# Patient Record
Sex: FEMALE | Race: WHITE | NOT HISPANIC OR LATINO | Employment: OTHER | URBAN - METROPOLITAN AREA
[De-identification: names, ages, dates, MRNs, and addresses within clinical notes are randomized per-mention and may not be internally consistent; named-entity substitution may affect disease eponyms.]

---

## 2017-11-21 ENCOUNTER — CONSULT (OUTPATIENT)
Dept: ONCOLOGY | Facility: CLINIC | Age: 62
End: 2017-11-21

## 2017-11-21 ENCOUNTER — LAB (OUTPATIENT)
Dept: LAB | Facility: HOSPITAL | Age: 62
End: 2017-11-21

## 2017-11-21 VITALS
DIASTOLIC BLOOD PRESSURE: 92 MMHG | RESPIRATION RATE: 16 BRPM | SYSTOLIC BLOOD PRESSURE: 130 MMHG | HEIGHT: 61 IN | TEMPERATURE: 97.8 F | BODY MASS INDEX: 31.72 KG/M2 | WEIGHT: 168 LBS | HEART RATE: 61 BPM | OXYGEN SATURATION: 95 %

## 2017-11-21 DIAGNOSIS — M81.0 OSTEOPOROSIS, UNSPECIFIED OSTEOPOROSIS TYPE, UNSPECIFIED PATHOLOGICAL FRACTURE PRESENCE: ICD-10-CM

## 2017-11-21 DIAGNOSIS — C73 FOLLICULAR THYROID CANCER (HCC): Primary | ICD-10-CM

## 2017-11-21 LAB
25(OH)D3 SERPL-MCNC: 28.4 NG/ML (ref 30–100)
ALBUMIN SERPL-MCNC: 4.4 G/DL (ref 3.5–5.2)
ALBUMIN/GLOB SERPL: 1.6 G/DL (ref 1.1–2.4)
ALP SERPL-CCNC: 63 U/L (ref 38–116)
ALT SERPL W P-5'-P-CCNC: 14 U/L (ref 0–33)
ANION GAP SERPL CALCULATED.3IONS-SCNC: 12.7 MMOL/L
AST SERPL-CCNC: 17 U/L (ref 0–32)
BASOPHILS # BLD AUTO: 0.06 10*3/MM3 (ref 0–0.1)
BASOPHILS NFR BLD AUTO: 0.6 % (ref 0–1.1)
BILIRUB SERPL-MCNC: 0.3 MG/DL (ref 0.1–1.2)
BUN BLD-MCNC: 15 MG/DL (ref 6–20)
BUN/CREAT SERPL: 26.3 (ref 7.3–30)
CALCIUM SPEC-SCNC: 9.2 MG/DL (ref 8.5–10.2)
CHLORIDE SERPL-SCNC: 102 MMOL/L (ref 98–107)
CO2 SERPL-SCNC: 24.3 MMOL/L (ref 22–29)
CREAT BLD-MCNC: 0.57 MG/DL (ref 0.6–1.1)
DEPRECATED RDW RBC AUTO: 45.7 FL (ref 37–49)
EOSINOPHIL # BLD AUTO: 0.29 10*3/MM3 (ref 0–0.36)
EOSINOPHIL NFR BLD AUTO: 2.9 % (ref 1–5)
ERYTHROCYTE [DISTWIDTH] IN BLOOD BY AUTOMATED COUNT: 13 % (ref 11.7–14.5)
GFR SERPL CREATININE-BSD FRML MDRD: 108 ML/MIN/1.73
GLOBULIN UR ELPH-MCNC: 2.7 GM/DL (ref 1.8–3.5)
GLUCOSE BLD-MCNC: 101 MG/DL (ref 74–124)
HCT VFR BLD AUTO: 42.8 % (ref 34–45)
HGB BLD-MCNC: 14.2 G/DL (ref 11.5–14.9)
IMM GRANULOCYTES # BLD: 0.05 10*3/MM3 (ref 0–0.03)
IMM GRANULOCYTES NFR BLD: 0.5 % (ref 0–0.5)
LYMPHOCYTES # BLD AUTO: 4.17 10*3/MM3 (ref 1–3.5)
LYMPHOCYTES NFR BLD AUTO: 42.3 % (ref 20–49)
MCH RBC QN AUTO: 31.8 PG (ref 27–33)
MCHC RBC AUTO-ENTMCNC: 33.2 G/DL (ref 32–35)
MCV RBC AUTO: 95.7 FL (ref 83–97)
MONOCYTES # BLD AUTO: 0.57 10*3/MM3 (ref 0.25–0.8)
MONOCYTES NFR BLD AUTO: 5.8 % (ref 4–12)
NEUTROPHILS # BLD AUTO: 4.71 10*3/MM3 (ref 1.5–7)
NEUTROPHILS NFR BLD AUTO: 47.9 % (ref 39–75)
NRBC BLD MANUAL-RTO: 0 /100 WBC (ref 0–0)
PLATELET # BLD AUTO: 243 10*3/MM3 (ref 150–375)
PMV BLD AUTO: 10.8 FL (ref 8.9–12.1)
POTASSIUM BLD-SCNC: 4.5 MMOL/L (ref 3.5–4.7)
PROT SERPL-MCNC: 7.1 G/DL (ref 6.3–8)
RBC # BLD AUTO: 4.47 10*6/MM3 (ref 3.9–5)
SODIUM BLD-SCNC: 139 MMOL/L (ref 134–145)
T-UPTAKE NFR SERPL: 1.12 TBI (ref 0.8–1.3)
T4 SERPL-MCNC: 6.21 MCG/DL (ref 4.5–11.7)
TSH SERPL DL<=0.05 MIU/L-ACNC: 2.05 MIU/ML (ref 0.27–4.2)
WBC NRBC COR # BLD: 9.85 10*3/MM3 (ref 4–10)

## 2017-11-21 PROCEDURE — 84436 ASSAY OF TOTAL THYROXINE: CPT | Performed by: INTERNAL MEDICINE

## 2017-11-21 PROCEDURE — 99204 OFFICE O/P NEW MOD 45 MIN: CPT | Performed by: INTERNAL MEDICINE

## 2017-11-21 PROCEDURE — 82306 VITAMIN D 25 HYDROXY: CPT | Performed by: INTERNAL MEDICINE

## 2017-11-21 PROCEDURE — 80053 COMPREHEN METABOLIC PANEL: CPT | Performed by: INTERNAL MEDICINE

## 2017-11-21 PROCEDURE — 84479 ASSAY OF THYROID (T3 OR T4): CPT | Performed by: INTERNAL MEDICINE

## 2017-11-21 PROCEDURE — 36415 COLL VENOUS BLD VENIPUNCTURE: CPT | Performed by: INTERNAL MEDICINE

## 2017-11-21 PROCEDURE — 36416 COLLJ CAPILLARY BLOOD SPEC: CPT | Performed by: INTERNAL MEDICINE

## 2017-11-21 PROCEDURE — 84443 ASSAY THYROID STIM HORMONE: CPT | Performed by: INTERNAL MEDICINE

## 2017-11-21 PROCEDURE — 85025 COMPLETE CBC W/AUTO DIFF WBC: CPT | Performed by: INTERNAL MEDICINE

## 2017-11-21 RX ORDER — ERGOCALCIFEROL 1.25 MG/1
CAPSULE ORAL
Refills: 3 | COMMUNITY
Start: 2017-10-30 | End: 2018-03-24 | Stop reason: SDUPTHER

## 2017-11-21 RX ORDER — QUETIAPINE FUMARATE 50 MG/1
50 TABLET, FILM COATED ORAL
COMMUNITY
End: 2018-03-23

## 2017-11-21 RX ORDER — OXYCODONE AND ACETAMINOPHEN 10; 325 MG/1; MG/1
TABLET ORAL
Refills: 0 | COMMUNITY
Start: 2017-11-01 | End: 2018-03-23

## 2017-11-21 RX ORDER — CHLORZOXAZONE 500 MG/1
TABLET ORAL
Refills: 0 | COMMUNITY
Start: 2017-10-05 | End: 2018-03-23

## 2017-11-21 RX ORDER — FOLIC ACID 1 MG/1
1 TABLET ORAL DAILY
COMMUNITY

## 2017-11-21 NOTE — PROGRESS NOTES
Subjective     REASON FOR CONSULTATION:  Follicular thyroid cancer  Provide an opinion on any further workup or treatment                             REQUESTING PHYSICIAN:  Dr. Ros Soto    RECORDS OBTAINED:  Records of the patients history including those obtained from the referring provider were reviewed and summarized in detail.    HISTORY OF PRESENT ILLNESS:  The patient is a 62 y.o. year old female who is here for an opinion about the above issue.    History of Present Illness patient is a 62-year-old female who was diagnosed with follicular thyroid cancer at [place] several years ago.  She was followed by an endocrinologist Dr. Mary Bunch, has been following up with her thyroglobulin levels.  Patient states that she underwent a right thyroidectomy and there they're concerned that there was a small spot in the left thyroid gland and she requires ultrasound of the thyroid gland done.  Unfortunately we do not have any of the records and we will try to complicate with her endocrinologist at [place].  The phone number there is [phone].  Currently she is gaining weight and she feels fatigued and tired.  She is concerned that her there is worsening of her hypothyroidism.    Patient denies any chest pain, shortness of breath, denies any nausea vomiting or headache.  She doesn't have any neck pain.  She needs to be referred to endocrinology here as she is new to the [facility].    Past Medical History:   Diagnosis Date   • Anxiety    • Basal cell carcinoma    • Depression    • Gout    • History of osteopenia    • History of thyroid nodule    • History of vitamin D deficiency    • Hypercholesterolemia    • Papillary thyroid carcinoma    • Postsurgical hypothyroidism         Past Surgical History:   Procedure Laterality Date   • APPENDECTOMY  1975   • BACK SURGERY      x2   • HYSTERECTOMY  2001   • THYROIDECTOMY, PARTIAL  2013             ALLERGIES:    Allergies   Allergen Reactions   • Latex    •  "Levothyroxine Sodium Hives     Synthroid   • Penicillins     She is allergic to let neck which causes her to swell    Social history she used to be a caterer.  She smoked 6-7 cigarettes a day for last 40 years.  She does not drink often only occasionally.  She is  2 para 2.    Family history father  of heavy drinking but he had kidney cancer at age 83.  Mother  of COPD at 72 she has one brother and 60 and 1 sister who  at 42.       Family History   Problem Relation Age of Onset   • Heart disease Other         Review of Systems   GENERAL:  Well-developed, well-nourished in no acute distress.   SKIN:  Warm, dry without rashes, purpura or petechiae.  EYES:  Pupils equal, round and reactive to light.  EOMs intact.  Conjunctivae normal.  EARS:  Hearing intact.  NOSE:  Septum midline.  No excoriations or nasal discharge.  MOUTH:  Tongue is well-papillated; no stomatitis or ulcers.  Lips normal.  THROAT:  Oropharynx without lesions or exudates.It is post right thyroid surgery,  NECK:  Supple with good range of motion; no thyromegaly or masses, no JVD.  LYMPHATICS:  No cervical, supraclavicular, axillary or inguinal adenopathy.  CHEST:  Lungs clear to auscultation. Good airflow.  CARDIAC:  Regular rate and rhythm without murmurs, rubs or gallops. Normal S1,S2.  ABDOMEN:  Soft, nontender with no hepatosplenomegaly or masses.  EXTREMITIES:  No clubbing, cyanosis or edema.  NEUROLOGICAL:  Cranial Nerves II-XII grossly intact.  No focal neurological deficits.  PSYCHIATRIC:  Normal affect and mood.        Objective     Vitals:    17 1422   BP: 130/92   Pulse: 61   Resp: 16   Temp: 97.8 °F (36.6 °C)   TempSrc: Oral   SpO2: 95%   Weight: 168 lb (76.2 kg)   Height: 60.71\" (154.2 cm)  Comment: new ht w/shoes   PainSc: 7  Comment: back     Current Status 2017   ECOG score 0       Physical Exam    GENERAL:  Well-developed, well-nourished in no acute distress.   SKIN:  Warm, dry without rashes, purpura or " petechiae.  EYES:  Pupils equal, round and reactive to light.  EOMs intact.  Conjunctivae normal.  EARS:  Hearing intact.  NOSE:  Septum midline.  No excoriations or nasal discharge.  MOUTH:  Tongue is well-papillated; no stomatitis or ulcers.  Lips normal.  THROAT:  Oropharynx without lesions or exudates.  NECK:  Supple with good range of motion; no thyromegaly or masses, no JVD.  LYMPHATICS:  No cervical, supraclavicular, axillary or inguinal adenopathy.  CHEST:  Lungs clear to auscultation. Good airflow.  CARDIAC:  Regular rate and rhythm without murmurs, rubs or gallops. Normal S1,S2.  ABDOMEN:  Soft, nontender with no hepatosplenomegaly or masses.  EXTREMITIES:  No clubbing, cyanosis or edema.  NEUROLOGICAL:  Cranial Nerves II-XII grossly intact.  No focal neurological deficits.  PSYCHIATRIC:  Normal affect and mood.        RECENT LABS:  Hematology WBC   Date Value Ref Range Status   11/21/2017 9.85 4.00 - 10.00 10*3/mm3 Final     RBC   Date Value Ref Range Status   11/21/2017 4.47 3.90 - 5.00 10*6/mm3 Final     Hemoglobin   Date Value Ref Range Status   11/21/2017 14.2 11.5 - 14.9 g/dL Final     Hematocrit   Date Value Ref Range Status   11/21/2017 42.8 34.0 - 45.0 % Final     Platelets   Date Value Ref Range Status   11/21/2017 243 150 - 375 10*3/mm3 Final          Assessment/Plan   1.  Follicular thyroid cancer status post right partial thyroidectomy several years ago.  This was diagnosed back in [place] and now she has moved over here.  She was followed by endocrinologist Dr. Mary Aguirre and had yearly ultrasounds of the thyroid gland.  She has not received any radioactive iodine treatments and is followed by thyroglobulin levels.  She needs a complete lab workup as well as ultrasound of the thyroid gland and a referral to endocrinology.  We do not have any records from [place] and will try to obtain those and discuss with her endocrinologist.    2.  Weight gain and fatigue we will need to check her  thyroid functions to make sure there is no evidence of any underlying hypothyroidism.    Plan 1.  Will obtain thyroglobulin panel, thyroglobulin tumor marker, TSH, vitamin D 25 hydroxy levels, a T3 and free T4.    2.  We will refer her to endocrinology Dr. Blunt.    3.  Obtain ultrasound of the thyroid gland    4.  We will follow up in 2 months    5 we will obtain records from her primary endocrinologist in [place].  He had left a message today with her.    Ani Orozco MD.

## 2017-11-22 LAB
THYROGLOB AB SERPL-ACNC: <1 IU/ML (ref 0–0.9)
THYROGLOBULIN BY IMA: 2.8 NG/ML (ref 1.5–38.5)

## 2017-11-24 ENCOUNTER — HOSPITAL ENCOUNTER (OUTPATIENT)
Dept: ULTRASOUND IMAGING | Facility: HOSPITAL | Age: 62
Discharge: HOME | End: 2017-11-24
Attending: INTERNAL MEDICINE | Admitting: INTERNAL MEDICINE

## 2017-11-24 DIAGNOSIS — C73 FOLLICULAR THYROID CANCER (HCC): ICD-10-CM

## 2017-11-24 DIAGNOSIS — M81.0 OSTEOPOROSIS, UNSPECIFIED OSTEOPOROSIS TYPE, UNSPECIFIED PATHOLOGICAL FRACTURE PRESENCE: ICD-10-CM

## 2017-11-24 PROCEDURE — 76536 US EXAM OF HEAD AND NECK: CPT

## 2017-11-27 LAB — THYROGLOBULIN (TG-RIA): <2 NG/ML

## 2017-11-28 ENCOUNTER — TELEPHONE (OUTPATIENT)
Dept: ONCOLOGY | Facility: HOSPITAL | Age: 62
End: 2017-11-28

## 2017-11-28 NOTE — TELEPHONE ENCOUNTER
Shashi sent to Dr Orozco:    Endocrinology could not get patient in until February and patient wants to be seen sooner then that, preferably before she sees you in January. Do you have any other recommendations for Endocrinologist? Pt is very concerned about getting someone who is highly rated.

## 2018-01-16 ENCOUNTER — APPOINTMENT (OUTPATIENT)
Dept: ONCOLOGY | Facility: CLINIC | Age: 63
End: 2018-01-16

## 2018-01-16 ENCOUNTER — APPOINTMENT (OUTPATIENT)
Dept: LAB | Facility: HOSPITAL | Age: 63
End: 2018-01-16

## 2018-03-02 ENCOUNTER — APPOINTMENT (OUTPATIENT)
Dept: LAB | Facility: HOSPITAL | Age: 63
End: 2018-03-02

## 2018-03-02 ENCOUNTER — APPOINTMENT (OUTPATIENT)
Dept: ONCOLOGY | Facility: CLINIC | Age: 63
End: 2018-03-02

## 2018-03-23 ENCOUNTER — OFFICE VISIT (OUTPATIENT)
Dept: ENDOCRINOLOGY | Age: 63
End: 2018-03-23

## 2018-03-23 VITALS
BODY MASS INDEX: 36.29 KG/M2 | HEIGHT: 59 IN | HEART RATE: 72 BPM | OXYGEN SATURATION: 98 % | WEIGHT: 180 LBS | SYSTOLIC BLOOD PRESSURE: 130 MMHG | DIASTOLIC BLOOD PRESSURE: 76 MMHG

## 2018-03-23 DIAGNOSIS — Z78.0 POST-MENOPAUSAL: ICD-10-CM

## 2018-03-23 DIAGNOSIS — C73 PAPILLARY THYROID CARCINOMA (HCC): ICD-10-CM

## 2018-03-23 DIAGNOSIS — M81.0 AGE-RELATED OSTEOPOROSIS WITHOUT CURRENT PATHOLOGICAL FRACTURE: ICD-10-CM

## 2018-03-23 DIAGNOSIS — E89.0 POSTOPERATIVE HYPOTHYROIDISM: Primary | ICD-10-CM

## 2018-03-23 PROCEDURE — 99205 OFFICE O/P NEW HI 60 MIN: CPT | Performed by: INTERNAL MEDICINE

## 2018-03-23 NOTE — PROGRESS NOTES
Chief Complaint   Patient presents with   • Thyroid Problem   • Osteoporosis   NEW PATIENT APPOINTMENT/THYROID CANCER/ OSTEOPOROSIS     HPI  Marilou Toro,62 y.o. WF is here as a new pt for the management of osteoporosis and thyroid cancer. Consulted by Dr. Soto.     In 2012 patient was diagnosed with a thyroid problem by a person in [facility] who noted that she had a nodule in her thyroid and advised her to see her physician.  This led to her visit to the ENT physician who performed thyroid nodule biopsy and recommended her to get right thyroid lobectomy.  Pathology was consistent with multifocal papillary thyroid cancer-[id number], she did not undergo radioactive iodine ablation after the surgery.  She still has the left thyroid lobe intact.  Thyroid ultrasound from November 2017 showed empty right thyroid bed, left thyroid lobe showed 2 mm hypoechoic thyroid nodules.    Patient was tried on Synthroid, levothyroxine 2012 at which time she could not tolerate the medication due to extreme depression, hair loss, changes changes and she also reports to me today that many women are dying due to Synthroid.  She feels the chemical taste and the chemical smell after she takes the Synthroid or levothyroxine.  It was at that time patient was started on nature thyroid.  Currently she is on half a tablet of 113.75 mg of the nature thyroid.  She takes the medication every single day but does express interest in stopping all the medications as she wanted to be as natural as possible.    Today in the clinic she is extremely concerned about weight gain, swelling, still continues to have hair loss, normal bowel movements, complains of dry skin and that she is aging fast, sleep is okay.  Normal temperature preference.  Occasional racing of heart, no tremors, no eye symptoms.  No family history of thyroid cancer, no radiation to her neck as a child.    Osteoporosis  Managed by her primary care in [place].  She was diagnosed with  osteoporosis approximately in 2010 and was started on a ibandronate which she takes once a month.  Also on vitamin D supplementation but does not take any calcium supplementation.  She has been on medication for more than 5 years.  She denied any GERD symptoms.  No history of fragility fractures.  Last bone density scan was from 2010.  Her last dental examination was approximately 4-5 years ago.    Pt was diagnosed with thyroid cancer in 2012 when she was seen by ENT due to thyroid nodule in her right thyroid lobe.       I have reviewed the patient's allergies, medicines, past medical hx, family hx and social hx in detail.    Past Medical History:   Diagnosis Date   • Anxiety    • Basal cell carcinoma    • Depression    • Gout    • History of osteopenia    • History of thyroid nodule    • History of vitamin D deficiency    • Hypercholesterolemia    • Papillary thyroid carcinoma 2013   • Postsurgical hypothyroidism        Family History   Problem Relation Age of Onset   • Heart disease Other    • Kidney cancer Father        Social History     Social History   • Marital status:      Spouse name: N/A   • Number of children: 2   • Years of education: High school     Occupational History   • Caterer Retired     Social History Main Topics   • Smoking status: Current Every Day Smoker     Packs/day: 0.25     Years: 40.00     Types: Cigarettes   • Smokeless tobacco: Never Used   • Alcohol use No   • Drug use: No   • Sexual activity: Not on file     Other Topics Concern   • Not on file     Social History Narrative   • No narrative on file       Allergies   Allergen Reactions   • Latex    • Levothyroxine Sodium Hives     Synthroid   • Penicillins          Current Outpatient Prescriptions:   •  Calcium-Magnesium-Zinc 167-83-8 MG tablet, Take  by mouth., Disp: , Rfl:   •  Cyanocobalamin (VITAMIN B 12 PO), Take  by mouth., Disp: , Rfl:   •  folic acid (FOLVITE) 1 MG tablet, Take 1 mg by mouth Daily., Disp: , Rfl:   •   "metoprolol tartrate (LOPRESSOR) 25 MG tablet, TAKE 1 TABLET BY MOUTH TWICE DAILY WITH FOOD, Disp: , Rfl: 0  •  Selenium 200 MCG tablet, Take  by mouth., Disp: , Rfl:   •  Thyroid 113.75 MG PO tablet, Take  by mouth., Disp: , Rfl:   •  vitamin D (ERGOCALCIFEROL) 34952 units capsule capsule, TAKE 1 TABLET BY MOUTH EVERY WEEK, Disp: , Rfl: 3     Review of Systems   Constitutional: Positive for fatigue and unexpected weight change. Negative for fever.   HENT: Positive for facial swelling. Negative for nosebleeds, trouble swallowing and voice change.    Eyes: Negative for pain and redness.   Respiratory: Negative for shortness of breath and wheezing.    Cardiovascular: Positive for palpitations and leg swelling.   Gastrointestinal: Positive for abdominal pain. Negative for diarrhea and vomiting.   Endocrine: Negative for polydipsia and polyuria.   Genitourinary: Negative for decreased urine volume and frequency.   Musculoskeletal: Negative for joint swelling.   Skin: Negative for rash.   Allergic/Immunologic: Negative for immunocompromised state.   Neurological: Positive for headaches. Negative for seizures and facial asymmetry.   Hematological: Bruises/bleeds easily.   Psychiatric/Behavioral: Positive for decreased concentration. Negative for agitation and confusion.       Objective:    /76   Pulse 72   Ht 149.9 cm (59\")   Wt 81.6 kg (180 lb)   SpO2 98%   BMI 36.36 kg/m²     Physical Exam   Constitutional: She is oriented to person, place, and time. She appears well-nourished.   HENT:   Head: Normocephalic and atraumatic.   Eyes: Conjunctivae and EOM are normal. No scleral icterus.   Neck: Normal range of motion. Neck supple. No thyromegaly present.   Left thyroid lobe felt.    Cardiovascular: Normal rate and normal heart sounds.  Exam reveals no friction rub.    No murmur heard.  HR - 72   Pulmonary/Chest: Effort normal and breath sounds normal. No stridor. She has no wheezes. She has no rales.   Abdominal: " Soft. Bowel sounds are normal. She exhibits no distension. There is no tenderness.   Musculoskeletal: She exhibits no edema or tenderness.   Lymphadenopathy:     She has no cervical adenopathy.   Neurological: She is alert and oriented to person, place, and time.   Skin: Skin is warm and dry. She is not diaphoretic.   Psychiatric: She has a normal mood and affect.   Vitals reviewed.      Results Review:    I reviewed the patient's new clinical results.    Consult on 11/21/2017   Component Date Value Ref Range Status   • TSH 11/21/2017 2.050  0.270 - 4.200 mIU/mL Final   • T Uptake 11/21/2017 1.12  0.80 - 1.30 TBI Final   • T4, Total 11/21/2017 6.21  4.50 - 11.70 mcg/dL Final       Marilou was seen today for thyroid problem and osteoporosis.    Diagnoses and all orders for this visit:    Postoperative hypothyroidism  -     Vitamin D 25 Hydroxy  -     PTH, Intact  -     TSH  -     T4, Free  -     T3, Free  -     Basic Metabolic Panel  -     DEXA Bone Density Axial; Future    Papillary thyroid carcinoma  -     Vitamin D 25 Hydroxy  -     PTH, Intact  -     TSH  -     T4, Free  -     T3, Free  -     Basic Metabolic Panel  -     DEXA Bone Density Axial; Future    Age-related osteoporosis without current pathological fracture  -     Vitamin D 25 Hydroxy  -     PTH, Intact  -     TSH  -     T4, Free  -     T3, Free  -     Basic Metabolic Panel  -     DEXA Bone Density Axial; Future    Post-menopausal  -     Vitamin D 25 Hydroxy  -     PTH, Intact  -     TSH  -     T4, Free  -     T3, Free  -     Basic Metabolic Panel  -     DEXA Bone Density Axial; Future    Hypothyroidism  Will check TSH, free T4, free T3 levels  Will adjust the dosage of nature thyroid based on her current blood work up.  Explained to the patient that I would not be doing nature thyroid and transferred her care to one of the providers who will prescribe nature thyroid.  Explained to the patient my reasons for not knowing the nature thyroid.  Counseled  her against stopping the thyroid medication without physician recommendation.    Multifocal papillary thyroid cancer  History of thyroid cancer 5 years ago, no need to suppress the TSH.  Thyroid ultrasound does not show any signs of recurrence.  Will continue to monitor her thyroid ultrasound at least once a year.    Osteoporosis  Discussed with the patient that I would start of with a bone density scan first.  Will proceed with ibandronate for now.   Based on the lab work up will consider calcium and vitamin D replacement.    Will discharge the patient from the clinic as I do not do nature thyroid.  However we will help the patient with thyroid replacement and the medication replacement for the next 1 month.    Thank you for asking me to see your patient Marilou Toro in consultation.      The total face to face time spent 34minutes with the patient,60minutes (greater than 50% of the total time) was spent counseling and coordination of the care on current clinical condition and treatment plan.    Lynn Deras MD  03/23/18

## 2018-03-24 LAB
25(OH)D3+25(OH)D2 SERPL-MCNC: 28.7 NG/ML (ref 30–100)
BUN SERPL-MCNC: 15 MG/DL (ref 8–23)
BUN/CREAT SERPL: 25.4 (ref 7–25)
CALCIUM SERPL-MCNC: 9.3 MG/DL (ref 8.6–10.5)
CHLORIDE SERPL-SCNC: 104 MMOL/L (ref 98–107)
CO2 SERPL-SCNC: 24.2 MMOL/L (ref 22–29)
CREAT SERPL-MCNC: 0.59 MG/DL (ref 0.57–1)
GFR SERPLBLD CREATININE-BSD FMLA CKD-EPI: 104 ML/MIN/1.73
GFR SERPLBLD CREATININE-BSD FMLA CKD-EPI: 126 ML/MIN/1.73
GLUCOSE SERPL-MCNC: 103 MG/DL (ref 65–99)
POTASSIUM SERPL-SCNC: 4.4 MMOL/L (ref 3.5–5.2)
PTH-INTACT SERPL-MCNC: 38 PG/ML (ref 15–65)
SODIUM SERPL-SCNC: 142 MMOL/L (ref 136–145)
T3FREE SERPL-MCNC: 3.2 PG/ML (ref 2–4.4)
T4 FREE SERPL-MCNC: 1.18 NG/DL (ref 0.93–1.7)
TSH SERPL DL<=0.005 MIU/L-ACNC: 3.19 MIU/ML (ref 0.27–4.2)

## 2018-03-24 RX ORDER — ERGOCALCIFEROL 1.25 MG/1
CAPSULE ORAL
Qty: 60 CAPSULE | Refills: 3 | Status: SHIPPED | OUTPATIENT
Start: 2018-03-24 | End: 2019-04-16 | Stop reason: SDUPTHER

## 2018-03-24 RX ORDER — IBANDRONATE SODIUM 150 MG/1
150 TABLET, FILM COATED ORAL
Qty: 1 TABLET | Refills: 11 | Status: SHIPPED | OUTPATIENT
Start: 2018-03-24 | End: 2019-03-24

## 2018-03-30 ENCOUNTER — APPOINTMENT (OUTPATIENT)
Dept: BONE DENSITY | Facility: HOSPITAL | Age: 63
End: 2018-03-30
Attending: INTERNAL MEDICINE

## 2018-04-06 ENCOUNTER — APPOINTMENT (OUTPATIENT)
Dept: BONE DENSITY | Facility: HOSPITAL | Age: 63
End: 2018-04-06
Attending: INTERNAL MEDICINE

## 2018-04-11 ENCOUNTER — OFFICE VISIT (OUTPATIENT)
Dept: ONCOLOGY | Facility: CLINIC | Age: 63
End: 2018-04-11

## 2018-04-11 ENCOUNTER — LAB (OUTPATIENT)
Dept: LAB | Facility: HOSPITAL | Age: 63
End: 2018-04-11

## 2018-04-11 VITALS
OXYGEN SATURATION: 98 % | TEMPERATURE: 97.9 F | SYSTOLIC BLOOD PRESSURE: 134 MMHG | WEIGHT: 181.6 LBS | HEART RATE: 68 BPM | HEIGHT: 61 IN | DIASTOLIC BLOOD PRESSURE: 82 MMHG | RESPIRATION RATE: 12 BRPM | BODY MASS INDEX: 34.29 KG/M2

## 2018-04-11 DIAGNOSIS — C73 FOLLICULAR THYROID CANCER (HCC): Primary | ICD-10-CM

## 2018-04-11 DIAGNOSIS — Z12.39 SCREENING BREAST EXAMINATION: ICD-10-CM

## 2018-04-11 LAB
BASOPHILS # BLD AUTO: 0.05 10*3/MM3 (ref 0–0.1)
BASOPHILS NFR BLD AUTO: 0.8 % (ref 0–1.1)
DEPRECATED RDW RBC AUTO: 45.2 FL (ref 37–49)
EOSINOPHIL # BLD AUTO: 0.21 10*3/MM3 (ref 0–0.36)
EOSINOPHIL NFR BLD AUTO: 3.4 % (ref 1–5)
ERYTHROCYTE [DISTWIDTH] IN BLOOD BY AUTOMATED COUNT: 12.9 % (ref 11.7–14.5)
HCT VFR BLD AUTO: 40.9 % (ref 34–45)
HGB BLD-MCNC: 13.7 G/DL (ref 11.5–14.9)
IMM GRANULOCYTES # BLD: 0.05 10*3/MM3 (ref 0–0.03)
IMM GRANULOCYTES NFR BLD: 0.8 % (ref 0–0.5)
LYMPHOCYTES # BLD AUTO: 3.03 10*3/MM3 (ref 1–3.5)
LYMPHOCYTES NFR BLD AUTO: 48.9 % (ref 20–49)
MCH RBC QN AUTO: 32.1 PG (ref 27–33)
MCHC RBC AUTO-ENTMCNC: 33.5 G/DL (ref 32–35)
MCV RBC AUTO: 95.8 FL (ref 83–97)
MONOCYTES # BLD AUTO: 0.47 10*3/MM3 (ref 0.25–0.8)
MONOCYTES NFR BLD AUTO: 7.6 % (ref 4–12)
NEUTROPHILS # BLD AUTO: 2.38 10*3/MM3 (ref 1.5–7)
NEUTROPHILS NFR BLD AUTO: 38.5 % (ref 39–75)
NRBC BLD MANUAL-RTO: 0 /100 WBC (ref 0–0)
PLATELET # BLD AUTO: 248 10*3/MM3 (ref 150–375)
PMV BLD AUTO: 10.9 FL (ref 8.9–12.1)
RBC # BLD AUTO: 4.27 10*6/MM3 (ref 3.9–5)
WBC NRBC COR # BLD: 6.19 10*3/MM3 (ref 4–10)

## 2018-04-11 PROCEDURE — 85025 COMPLETE CBC W/AUTO DIFF WBC: CPT | Performed by: INTERNAL MEDICINE

## 2018-04-11 PROCEDURE — 99213 OFFICE O/P EST LOW 20 MIN: CPT | Performed by: INTERNAL MEDICINE

## 2018-04-11 PROCEDURE — 36415 COLL VENOUS BLD VENIPUNCTURE: CPT | Performed by: INTERNAL MEDICINE

## 2018-04-11 RX ORDER — CLOTRIMAZOLE AND BETAMETHASONE DIPROPIONATE 10; .64 MG/G; MG/G
CREAM TOPICAL
COMMUNITY
Start: 2018-03-23 | End: 2019-12-03

## 2018-04-11 RX ORDER — CETIRIZINE HYDROCHLORIDE 10 MG/1
TABLET ORAL
Refills: 1 | COMMUNITY
Start: 2018-03-22 | End: 2019-12-03

## 2018-04-11 NOTE — PROGRESS NOTES
Subjective .     REASONS FOR FOLLOWUP: Follicular thyroid cancer    HISTORY OF PRESENT ILLNESS:  The patient is a 63 y.o. year old female who is here for follow-up with the above-mentioned history.    History of Present Illness   patient is a 62-year-old female who was diagnosed with follicular thyroid cancer at [place] several years ago.  She was followed by an endocrinologist Dr. Mary Bunch, has been following up with her thyroglobulin levels.  Patient states that she underwent a right thyroidectomy and there they're concerned that there was a small spot in the left thyroid gland and she requires ultrasound of the thyroid gland done.  Unfortunately we do not have any of the records and we will try to complicate with her endocrinologist at [place].  The phone number there is [phone].  Currently she is gaining weight and she feels fatigued and tired.  She is concerned that her there is worsening of her hypothyroidism.     Patient denies any chest pain, shortness of breath, denies any nausea vomiting or headache.  She doesn't have any neck pain.  She needs to be referred to endocrinology here as she is new to the city.    Pt is doing well. No complaints. Endocrine is not following her for thyroid cancer. Also she feels very weak.    Past Medical History:   Diagnosis Date   • Anxiety    • Basal cell carcinoma    • Depression    • Gout    • History of osteopenia    • History of thyroid nodule    • History of vitamin D deficiency    • Hypercholesterolemia    • Papillary thyroid carcinoma 2013   • Postsurgical hypothyroidism        ONCOLOGIC HISTORY:  (History from previous dates can be found in the separate document.)    Current Outpatient Prescriptions on File Prior to Visit   Medication Sig Dispense Refill   • Calcium-Magnesium-Zinc 167-83-8 MG tablet Take  by mouth.     • Cyanocobalamin (VITAMIN B 12 PO) Take  by mouth.     • folic acid (FOLVITE) 1 MG tablet Take 1 mg by mouth Daily.     •  ibandronate (BONIVA) 150 MG tablet Take 1 tablet by mouth Every 30 (Thirty) Days. 1 tablet 11   • metoprolol tartrate (LOPRESSOR) 25 MG tablet TAKE 1 TABLET BY MOUTH TWICE DAILY WITH FOOD  0   • Selenium 200 MCG tablet Take  by mouth.     • Thyroid 113.75 MG PO tablet Take 1/2 tab once daily. 30 tablet 11   • vitamin D (ERGOCALCIFEROL) 23353 units capsule capsule Take 1 tab twice a week 60 capsule 3     No current facility-administered medications on file prior to visit.        ALLERGIES:     Allergies   Allergen Reactions   • Latex    • Levothyroxine Sodium Hives     Synthroid   • Penicillins        Social History     Social History   • Marital status:      Spouse name: N/A   • Number of children: 2   • Years of education: High school     Occupational History   • Caterer Retired     Social History Main Topics   • Smoking status: Current Every Day Smoker     Packs/day: 0.25     Years: 40.00     Types: Cigarettes   • Smokeless tobacco: Never Used   • Alcohol use No   • Drug use: No   • Sexual activity: Not on file     Other Topics Concern   • Not on file     Social History Narrative   • No narrative on file         Cancer-related family history includes Kidney cancer in her father.     Review of Systems  A comprehensive 14 point review of systems was performed and was negative except as mentioned.    Objective      There were no vitals filed for this visit.  Current Status 11/21/2017   ECOG score 0       Physical Exam    GENERAL:  Well-developed, well-nourished in no acute distress.   SKIN:  Warm, dry without rashes, purpura or petechiae.  EYES:  Pupils equal, round and reactive to light.  EOMs intact.  Conjunctivae normal.  EARS:  Hearing intact.  NOSE:  Septum midline.  No excoriations or nasal discharge.  MOUTH:  Tongue is well-papillated; no stomatitis or ulcers.  Lips normal.  THROAT:  Oropharynx without lesions or exudates.  NECK:  Supple with good range of motion; no thyromegaly or masses, no  JVD.  LYMPHATICS:  No cervical, supraclavicular, axillary or inguinal adenopathy.  CHEST:  Lungs clear to auscultation. Good airflow.  CARDIAC:  Regular rate and rhythm without murmurs, rubs or gallops. Normal S1,S2.  ABDOMEN:  Soft, nontender with no hepatosplenomegaly or masses.  EXTREMITIES:  No clubbing, cyanosis or edema.  NEUROLOGICAL:  Cranial Nerves II-XII grossly intact.  No focal neurological deficits.  PSYCHIATRIC:  Normal affect and mood.      RECENT LABS:  Hematology WBC   Date Value Ref Range Status   11/21/2017 9.85 4.00 - 10.00 10*3/mm3 Final     RBC   Date Value Ref Range Status   11/21/2017 4.47 3.90 - 5.00 10*6/mm3 Final     Hemoglobin   Date Value Ref Range Status   11/21/2017 14.2 11.5 - 14.9 g/dL Final     Hematocrit   Date Value Ref Range Status   11/21/2017 42.8 34.0 - 45.0 % Final     Platelets   Date Value Ref Range Status   11/21/2017 243 150 - 375 10*3/mm3 Final        Assessment/Plan   1.  Follicular thyroid cancer status post right partial thyroidectomy several years ago.  This was diagnosed back in [place] and now she has moved over here.  She was followed by endocrinologist Dr. Mary Aguirre and had yearly ultrasounds of the thyroid gland.  She has not received any radioactive iodine treatments and is followed by thyroglobulin levels.  She needs a complete lab workup as well as ultrasound of the thyroid gland and a referral to endocrinology.  We do not have any records from [place].    Pt saw endocrine and they started her on oral bisphosphonates  But did not give any appointments for fu. pts thyroglobulin is normal.       2.  Weight gain and fatigue we will need to check her thyroid functions to make sure there is no evidence of any underlying hypothyroidism.i have reviewed her thyroid scan.     Plan 1.  ref to Dr Norton to fu with her hypothyroidism and follicular thyroid cancer.    2. Obtain mammogram.    3. Refer her to dermatology     .Ani Orozco MD                   Cc:  Sarbjit Soto MD

## 2018-04-20 ENCOUNTER — APPOINTMENT (OUTPATIENT)
Dept: BONE DENSITY | Facility: HOSPITAL | Age: 63
End: 2018-04-20
Attending: INTERNAL MEDICINE

## 2018-04-27 ENCOUNTER — APPOINTMENT (OUTPATIENT)
Dept: MAMMOGRAPHY | Facility: HOSPITAL | Age: 63
End: 2018-04-27
Attending: INTERNAL MEDICINE

## 2018-05-11 ENCOUNTER — APPOINTMENT (OUTPATIENT)
Dept: MAMMOGRAPHY | Facility: HOSPITAL | Age: 63
End: 2018-05-11
Attending: INTERNAL MEDICINE

## 2018-05-11 ENCOUNTER — HOSPITAL ENCOUNTER (OUTPATIENT)
Dept: BONE DENSITY | Facility: HOSPITAL | Age: 63
Discharge: HOME | End: 2018-05-11
Attending: INTERNAL MEDICINE | Admitting: INTERNAL MEDICINE

## 2018-05-11 ENCOUNTER — HOSPITAL ENCOUNTER (OUTPATIENT)
Dept: MAMMOGRAPHY | Facility: HOSPITAL | Age: 63
End: 2018-05-11
Attending: INTERNAL MEDICINE

## 2018-05-11 DIAGNOSIS — C73 PAPILLARY THYROID CARCINOMA (HCC): ICD-10-CM

## 2018-05-11 DIAGNOSIS — E89.0 POSTOPERATIVE HYPOTHYROIDISM: ICD-10-CM

## 2018-05-11 DIAGNOSIS — M81.0 AGE-RELATED OSTEOPOROSIS WITHOUT CURRENT PATHOLOGICAL FRACTURE: ICD-10-CM

## 2018-05-11 DIAGNOSIS — Z78.0 POST-MENOPAUSAL: ICD-10-CM

## 2018-05-11 PROCEDURE — 77080 DXA BONE DENSITY AXIAL: CPT

## 2018-05-13 NOTE — PROGRESS NOTES
Mail results to pt, no treatment changes at this time.  Will continue ibandronate for now.   Patient is scheduled to see Inna in June 2018 at that time she could discuss with her about changing to another bone medication.

## 2018-06-05 ENCOUNTER — HOSPITAL ENCOUNTER (OUTPATIENT)
Dept: LAB | Facility: HOSPITAL | Age: 63
Setting detail: SPECIMEN
Discharge: HOME | End: 2018-06-05
Attending: INTERNAL MEDICINE | Admitting: INTERNAL MEDICINE

## 2018-07-27 ENCOUNTER — TELEPHONE (OUTPATIENT)
Dept: ONCOLOGY | Facility: CLINIC | Age: 63
End: 2018-07-27

## 2018-07-27 NOTE — TELEPHONE ENCOUNTER
----- Message from Elizabeth Seymour sent at 7/27/2018  3:50 PM EDT -----  Regarding: missed mammo   8/2 appt

## 2018-08-02 ENCOUNTER — APPOINTMENT (OUTPATIENT)
Dept: LAB | Facility: HOSPITAL | Age: 63
End: 2018-08-02

## 2018-11-07 ENCOUNTER — HOSPITAL ENCOUNTER (OUTPATIENT)
Dept: OTHER | Facility: HOSPITAL | Age: 63
Discharge: HOME | End: 2018-11-07

## 2019-04-03 RX ORDER — IBANDRONATE SODIUM 150 MG/1
TABLET, FILM COATED ORAL
Qty: 1 TABLET | Refills: 9 | OUTPATIENT
Start: 2019-04-03

## 2019-04-17 RX ORDER — ERGOCALCIFEROL 1.25 MG/1
CAPSULE ORAL
Qty: 60 CAPSULE | Refills: 0 | Status: SHIPPED | OUTPATIENT
Start: 2019-04-17 | End: 2019-12-03

## 2019-12-03 ENCOUNTER — OFFICE VISIT (OUTPATIENT)
Dept: ORTHOPEDIC SURGERY | Facility: CLINIC | Age: 64
End: 2019-12-03

## 2019-12-03 VITALS
DIASTOLIC BLOOD PRESSURE: 82 MMHG | HEIGHT: 60 IN | BODY MASS INDEX: 36.32 KG/M2 | SYSTOLIC BLOOD PRESSURE: 130 MMHG | HEART RATE: 71 BPM | WEIGHT: 185 LBS

## 2019-12-03 DIAGNOSIS — E55.9 VITAMIN D DEFICIENCY: ICD-10-CM

## 2019-12-03 DIAGNOSIS — C73 CARCINOMA OF THYROID GLAND (HCC): ICD-10-CM

## 2019-12-03 DIAGNOSIS — M81.0 AGE-RELATED OSTEOPOROSIS WITHOUT CURRENT PATHOLOGICAL FRACTURE: Primary | ICD-10-CM

## 2019-12-03 PROBLEM — K57.30 DIVERTICULAR DISEASE OF COLON: Status: ACTIVE | Noted: 2017-11-02

## 2019-12-03 PROBLEM — R00.2 INTERMITTENT PALPITATIONS: Status: ACTIVE | Noted: 2017-08-26

## 2019-12-03 PROBLEM — E04.9 GOITER: Status: ACTIVE | Noted: 2017-11-02

## 2019-12-03 PROBLEM — M54.9 CHRONIC BACK PAIN: Status: ACTIVE | Noted: 2017-11-02

## 2019-12-03 PROBLEM — E89.0 HYPOTHYROIDISM, POSTSURGICAL: Status: ACTIVE | Noted: 2018-06-05

## 2019-12-03 PROBLEM — G89.29 CHRONIC BACK PAIN: Status: ACTIVE | Noted: 2017-11-02

## 2019-12-03 PROCEDURE — 99203 OFFICE O/P NEW LOW 30 MIN: CPT | Performed by: PHYSICIAN ASSISTANT

## 2019-12-03 RX ORDER — LEVOTHYROXINE AND LIOTHYRONINE 57; 13.5 UG/1; UG/1
90 TABLET ORAL DAILY
COMMUNITY
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
## 2019-12-03 NOTE — PATIENT INSTRUCTIONS
Osteoporosis    Osteoporosis happens when your bones get thin and weak. This can cause your bones to break (fracture) more easily. You can do things at home to make your bones stronger.  Follow these instructions at home:    Activity  · Exercise as told by your doctor. Ask your doctor what activities are safe for you. You should do:  ? Exercises that make your muscles work to hold your body weight up (weight-bearing exercises). These include tank chi, yoga, and walking.  ? Exercises to make your muscles stronger. One example is lifting weights.  Lifestyle  · Limit alcohol intake to no more than 1 drink a day for nonpregnant women and 2 drinks a day for men. One drink equals 12 oz of beer, 5 oz of wine, or 1½ oz of hard liquor.  · Do not use any products that have nicotine or tobacco in them. These include cigarettes and e-cigarettes. If you need help quitting, ask your doctor.  Preventing falls  · Use tools to help you move around (mobility aids) as needed. These include canes, walkers, scooters, and crutches.  · Keep rooms well-lit and free of clutter.  · Put away things that could make you trip. These include cords and rugs.  · Install safety rails on stairs. Install grab bars in bathrooms.  · Use rubber mats in slippery areas, like bathrooms.  · Wear shoes that:  ? Fit you well.  ? Support your feet.  ? Have closed toes.  ? Have rubber soles or low heels.  · Tell your doctor about all of the medicines you are taking. Some medicines can make you more likely to fall.  General instructions  · Eat plenty of calcium and vitamin D. These nutrients are good for your bones. Good sources of calcium and vitamin D include:  ? Some fatty fish, such as salmon and tuna.  ? Foods that have calcium and vitamin D added to them (fortified foods). For example, some breakfast cereals are fortified with calcium and vitamin D.  ? Egg yolks.  ? Cheese.  ? Liver.  · Take over-the-counter and prescription medicines only as told by your  doctor.  · Keep all follow-up visits as told by your doctor. This is important.  Contact a doctor if:  · You have not been tested (screened) for osteoporosis and you are:  ? A woman who is age 65 or older.  ? A man who is age 70 or older.  Get help right away if:  · You fall.  · You get hurt.  Summary  · Osteoporosis happens when your bones get thin and weak.  · Weak bones can break (fracture) more easily.  · Eat plenty of calcium and vitamin D. These nutrients are good for your bones.  · Tell your doctor about all of the medicines that you take.  This information is not intended to replace advice given to you by your health care provider. Make sure you discuss any questions you have with your health care provider.  Document Released: 03/11/2013 Document Revised: 10/12/2018 Document Reviewed: 10/12/2018  [employer] Interactive Patient Education © 2019 [employer]      Preventing Health Risks of Being Overweight  Maintaining a healthy body weight is an important part of your overall health. Your healthy body weight depends on your age, gender, and height. Being overweight puts you at risk for many health problems, including:  · Heart disease.  · Diabetes.  · Problems sleeping.  · Joint problems.  You can make changes to your diet and lifestyle to prevent these risks. Consider working with a health care provider or a dietitian to make these changes.  What nutrition changes can be made?    · Eat only as much as your body needs. In most cases, this is about 2,000 calories a day, but the amount varies depending on your height, gender, and activity level. Ask your health care provider how many calories you should have each day. Eating more than your body needs on a regular basis can cause you to become overweight or obese.  · Eat slowly, and stop eating when you feel full.  · Choose healthy foods, including:  ? Fruits and vegetables.  ? Lean meats.  ? Low-fat dairy products.  ? High-fiber foods, such as whole grains and  beans.  ? Healthy snacks like vegetable sticks, a piece of fruit, or a small amount of yogurt or cheese.  · Avoid foods and drinks that are high in sugar, salt (sodium), saturated fat, or trans fat. This includes:  ? Many desserts such as candy, cookies, and ice cream.  ? Soda.  ? Fried foods.  ? Processed meats such as hot dogs or lunch meats.  ? Prepackaged snack foods.  What lifestyle changes can be made?    · Exercise for at least 150 minutes a week to prevent weight gain, or as often as recommended by your health care provider. Do moderate-intensity exercise, such as brisk walking.  ? Spread it out by exercising for 30 minutes 5 days a week, or in short 10-minute bursts several times a day.  · Find other ways to stay active and burn calories, such as yard work or a hobby that involves physical activity.  · Get at least 8 hours of sleep each night. When you are well-rested, you are more likely to be active and make healthy choices during the day. To sleep better:  ? Try to go to bed and wake up at about the same time every day.  ? Keep your bedroom dark, quiet, and cool.  ? Make sure that your bed is comfortable.  ? Avoid stimulating activities, such as watching television or exercising, for at least one hour before bedtime.  Why are these changes important?  Eating healthy and being active helps you lose weight and prevent health problems caused by being overweight. Making these changes can also help you manage stress, feel better mentally, and connect with friends and family.  What can happen if changes are not made?  Being overweight can affect you for your entire life. You may develop joint or bone problems that make it painful or difficult for you to play sports or do activities you enjoy. Being overweight puts stress on your heart and lungs and can lead to medical problems like diabetes, heart disease, and sleeping problems.  Where to find support  You can get support for preventing health risks of being  overweight from:  · Your health care provider or a dietitian. They can provide guidance about healthy eating and healthy lifestyle choices.  · Weight loss support groups, online or in-person.  Where to find more information  · MyPlate: www.choosemyplate.gov  ? This an online tool that provides personalized recommendations about foods to eat each day.  · The Centers for Disease Control and Prevention: www.cdc.gov/healthyweight  ? This resource gives tips for managing weight and having an active lifestyle.  Summary  · To prevent unhealthy weight gain, it is important to maintain a healthy diet high in vegetables and whole grains, exercise regularly, and get at least 8 hours of sleep each night.  · Making these changes helps prevent many long-term (chronic) health conditions that can shorten your life, such as diabetes, heart disease, and stroke.  This information is not intended to replace advice given to you by your health care provider. Make sure you discuss any questions you have with your health care provider.  Document Released: 11/14/2018 Document Revised: 11/14/2018 Document Reviewed: 11/14/2018  [employer] Interactive Patient Education © 2019 [employer]

## 2019-12-10 DIAGNOSIS — M81.0 AGE-RELATED OSTEOPOROSIS WITHOUT CURRENT PATHOLOGICAL FRACTURE: ICD-10-CM

## 2019-12-10 DIAGNOSIS — E55.9 VITAMIN D DEFICIENCY: Primary | ICD-10-CM

## 2019-12-10 DIAGNOSIS — C73 CARCINOMA OF THYROID GLAND (HCC): ICD-10-CM

## 2019-12-11 NOTE — PROGRESS NOTES
Called and left message for patient to go [facility] prior to her follow up

## 2020-04-29 ENCOUNTER — OFFICE VISIT (OUTPATIENT)
Dept: NEUROSURGERY | Facility: CLINIC | Age: 65
End: 2020-04-29

## 2020-04-29 DIAGNOSIS — Z98.890 HISTORY OF LUMBAR SURGERY: ICD-10-CM

## 2020-04-29 DIAGNOSIS — M54.16 LUMBAR RADICULOPATHY: Primary | ICD-10-CM

## 2020-04-29 PROCEDURE — 99442 PR PHYS/QHP TELEPHONE EVALUATION 11-20 MIN: CPT | Performed by: NURSE PRACTITIONER
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
## 2020-06-02 ENCOUNTER — TELEPHONE (OUTPATIENT)
Dept: NEUROSURGERY | Facility: CLINIC | Age: 65
End: 2020-06-02

## 2020-06-02 NOTE — TELEPHONE ENCOUNTER
PATIENT CALLED TO LET  KNOW THAT SHE HAD A FALL AND BROKE HER WRIST AND ALSO AND HAS  A COMPRESSION  FRACTURE. PLEASE CALL PATIENT BACK TO SEE WHAT NEXT STEPS WILL BE    [phone]

## 2020-06-09 ENCOUNTER — TELEPHONE (OUTPATIENT)
Dept: NEUROSURGERY | Facility: CLINIC | Age: 65
End: 2020-06-09

## 2020-06-09 NOTE — TELEPHONE ENCOUNTER
PATIENT CALLED IN, SHE LIVES IN [facility]. PATIENT IS SEEING A BACK DOCTOR IN [facility].

## 2020-08-05 ENCOUNTER — PREP FOR SURGERY (OUTPATIENT)
Dept: OTHER | Facility: HOSPITAL | Age: 65
End: 2020-08-05

## 2020-08-05 ENCOUNTER — HOSPITAL ENCOUNTER (OUTPATIENT)
Dept: GENERAL RADIOLOGY | Facility: HOSPITAL | Age: 65
Discharge: HOME | End: 2020-08-05
Admitting: NEUROLOGICAL SURGERY

## 2020-08-05 ENCOUNTER — OFFICE VISIT (OUTPATIENT)
Dept: NEUROSURGERY | Facility: CLINIC | Age: 65
End: 2020-08-05

## 2020-08-05 VITALS
WEIGHT: 170 LBS | SYSTOLIC BLOOD PRESSURE: 149 MMHG | RESPIRATION RATE: 18 BRPM | DIASTOLIC BLOOD PRESSURE: 83 MMHG | HEART RATE: 75 BPM | HEIGHT: 60 IN | BODY MASS INDEX: 33.38 KG/M2

## 2020-08-05 DIAGNOSIS — C73 FOLLICULAR THYROID CANCER (HCC): ICD-10-CM

## 2020-08-05 DIAGNOSIS — Z98.890 HISTORY OF LUMBAR SURGERY: Primary | ICD-10-CM

## 2020-08-05 DIAGNOSIS — S32.010A COMPRESSION FRACTURE OF L1 VERTEBRA, INITIAL ENCOUNTER (HCC): ICD-10-CM

## 2020-08-05 DIAGNOSIS — S32.010A COMPRESSION FRACTURE OF L1 VERTEBRA, INITIAL ENCOUNTER (HCC): Primary | ICD-10-CM

## 2020-08-05 PROCEDURE — 72100 X-RAY EXAM L-S SPINE 2/3 VWS: CPT

## 2020-08-05 PROCEDURE — 99204 OFFICE O/P NEW MOD 45 MIN: CPT | Performed by: NEUROLOGICAL SURGERY

## 2020-08-05 RX ORDER — CLINDAMYCIN PHOSPHATE 900 MG/50ML
900 INJECTION, SOLUTION INTRAVENOUS ONCE
Status: CANCELLED | OUTPATIENT
Start: 2020-08-05 | End: 2020-08-05

## 2020-08-05 RX ORDER — SODIUM CHLORIDE 0.9 % (FLUSH) 0.9 %
3-10 SYRINGE (ML) INJECTION AS NEEDED
Status: CANCELLED | OUTPATIENT
Start: 2020-08-05

## 2020-08-05 RX ORDER — SODIUM CHLORIDE 0.9 % (FLUSH) 0.9 %
3 SYRINGE (ML) INJECTION EVERY 12 HOURS SCHEDULED
Status: CANCELLED | OUTPATIENT
Start: 2020-08-05

## 2020-08-05 NOTE — PROGRESS NOTES
"Subjective   Marilou Toro is a 65 y.o. female.     Chief Complaint   Patient presents with   • Follow-up     low back pain, f/u after imaging      Visit Vitals  /83 (BP Location: Right arm, Patient Position: Sitting, Cuff Size: Large Adult)   Pulse 75   Resp 18   Ht 152.4 cm (60\")   Wt 77.1 kg (170 lb)   BMI 33.20 kg/m²       History of Present Illness: Mrs. Toro is a 65-year-old lady who fell several weeks ago suffering acute back pain.  She has had back pain before and underwent a vertebroplasty in [facility] which she states really did not help much.  She was relatively active but before this happened.  She was placed in a TLSO brace and she has been on this for several weeks with no significant relief.  She is taking oral medications and states that she cannot perform her daily activities.  She has a history of thyroid cancer in 2013 and underwent a thyroidectomy.  But she denies any fever, chills, weight loss, urinary urgency or incontinence.  She describes the pain is quite intense whenever she tries to move roll or stand.  If she leans over and raises her self it will ease the pain up.    The following portions of the patient's history were reviewed and updated as appropriate: allergies, current medications, past family history, past medical history, past social history, past surgical history and problem list.    Review of Systems         Past Surgical History:   Procedure Laterality Date   • APPENDECTOMY  1975   • BACK SURGERY  2005, 2009    x2   • HYSTERECTOMY  2001   • THYROIDECTOMY, PARTIAL  2013       Past Medical History:   Diagnosis Date   • Anxiety    • Basal cell carcinoma    • Depression    • Gout    • History of osteopenia    • History of thyroid nodule    • History of vitamin D deficiency    • Hypercholesterolemia    • Papillary thyroid carcinoma (CMS/HCC) 2013   • Postsurgical hypothyroidism      Social History     Socioeconomic History   • Marital status:      Spouse " name: Not on file   • Number of children: 2   • Years of education: High school   • Highest education level: Not on file   Occupational History   • Occupation: Caterer     Employer: RETIRED   Tobacco Use   • Smoking status: Former Smoker     Packs/day: 0.25     Years: 40.00     Pack years: 10.00     Types: Cigarettes   • Smokeless tobacco: Never Used   Substance and Sexual Activity   • Alcohol use: Yes     Comment: rarely   • Drug use: No   • Sexual activity: Defer      Family History   Problem Relation Age of Onset   • Heart disease Other    • Kidney cancer Father           Objective   Physical Exam  Neurologic Exam  Ortho Exam    Well fed, well-developed, and apparent distress   alert and oriented by 3  Speech is intact and coherent articulate with good content and production  Cranial nerves III through XII are grossly intact with pupils symmetric and reactive and no gaze paresis or nystagmus  Sensation is intact to soft touch and pinprick  in both upper and lower extremities  Motor strength is 5/5 in both upper and lower extremities with no focal motor deficits  Reflexes are 2+ in both upper and lower extremities with no upper motor neuron signs  Gait is nonantalgic  Heel toe walking is impaired due to pain  No dysmetria or dysdiadochokinesia  Tender to palpation upper lumbar lower thoracic region  No palpable deformity  Severely decreased range of motion with no effort to perform  Extremities normal symmetrical 2+ distal pulses and less than 2-second cap refill      Assessment and Plan: Mrs. Toro is a 65-year-old lady who is failing bracing with an acute L1 fracture with approximately 40% loss of vertebral body height.  At this time I do not feel conservative measures are of value and I proposed her an L4 vertebroplasty.  I do not see any evidence on the MRI suggestive of neoplastic process but with her history of thyroid cancer I feel that a biopsy should be performed at that time as well.  We did discuss the  risk of the procedure being bleeding, death, paralysis, infection, CSF leak, failure the procedure need for the procedure.  She indicates understanding wishes to proceed.      Problems Addressed this Visit     None

## 2020-08-06 PROBLEM — S32.010A COMPRESSION FRACTURE OF L1 LUMBAR VERTEBRA (HCC): Status: ACTIVE | Noted: 2020-08-06

## 2020-08-06 RX ORDER — HYDROCODONE BITARTRATE AND ACETAMINOPHEN 5; 325 MG/1; MG/1
1 TABLET ORAL EVERY 6 HOURS PRN
COMMUNITY
End: 2020-08-21

## 2020-08-06 RX ORDER — TIZANIDINE 4 MG/1
4 TABLET ORAL 3 TIMES DAILY PRN
COMMUNITY
End: 2020-09-02

## 2020-08-08 ENCOUNTER — LAB (OUTPATIENT)
Dept: LAB | Facility: HOSPITAL | Age: 65
End: 2020-08-08

## 2020-08-08 ENCOUNTER — HOSPITAL ENCOUNTER (OUTPATIENT)
Dept: CARDIOLOGY | Facility: HOSPITAL | Age: 65
Discharge: HOME | End: 2020-08-08
Admitting: NEUROLOGICAL SURGERY

## 2020-08-08 DIAGNOSIS — S32.010A COMPRESSION FRACTURE OF L1 VERTEBRA, INITIAL ENCOUNTER (HCC): ICD-10-CM

## 2020-08-08 LAB
ALBUMIN SERPL-MCNC: 4.4 G/DL (ref 3.5–5.2)
ALBUMIN/GLOB SERPL: 1.8 G/DL
ALP SERPL-CCNC: 55 U/L (ref 39–117)
ALT SERPL W P-5'-P-CCNC: 15 U/L (ref 1–33)
ANION GAP SERPL CALCULATED.3IONS-SCNC: 11.3 MMOL/L (ref 5–15)
AST SERPL-CCNC: 14 U/L (ref 1–32)
BASOPHILS # BLD AUTO: 0.05 10*3/MM3 (ref 0–0.2)
BASOPHILS NFR BLD AUTO: 0.8 % (ref 0–1.5)
BILIRUB SERPL-MCNC: 0.4 MG/DL (ref 0–1.2)
BUN SERPL-MCNC: 16 MG/DL (ref 8–23)
BUN/CREAT SERPL: 29.1 (ref 7–25)
CALCIUM SPEC-SCNC: 9.6 MG/DL (ref 8.6–10.5)
CHLORIDE SERPL-SCNC: 103 MMOL/L (ref 98–107)
CO2 SERPL-SCNC: 26.7 MMOL/L (ref 22–29)
CREAT SERPL-MCNC: 0.55 MG/DL (ref 0.57–1)
DEPRECATED RDW RBC AUTO: 41.8 FL (ref 37–54)
EOSINOPHIL # BLD AUTO: 0.26 10*3/MM3 (ref 0–0.4)
EOSINOPHIL NFR BLD AUTO: 4.3 % (ref 0.3–6.2)
ERYTHROCYTE [DISTWIDTH] IN BLOOD BY AUTOMATED COUNT: 11.8 % (ref 12.3–15.4)
GFR SERPL CREATININE-BSD FRML MDRD: 111 ML/MIN/1.73
GLOBULIN UR ELPH-MCNC: 2.4 GM/DL
GLUCOSE SERPL-MCNC: 102 MG/DL (ref 65–99)
HCT VFR BLD AUTO: 41.9 % (ref 34–46.6)
HGB BLD-MCNC: 13.7 G/DL (ref 12–15.9)
IMM GRANULOCYTES # BLD AUTO: 0.02 10*3/MM3 (ref 0–0.05)
IMM GRANULOCYTES NFR BLD AUTO: 0.3 % (ref 0–0.5)
LYMPHOCYTES # BLD AUTO: 2.29 10*3/MM3 (ref 0.7–3.1)
LYMPHOCYTES NFR BLD AUTO: 37.9 % (ref 19.6–45.3)
MCH RBC QN AUTO: 31.6 PG (ref 26.6–33)
MCHC RBC AUTO-ENTMCNC: 32.7 G/DL (ref 31.5–35.7)
MCV RBC AUTO: 96.5 FL (ref 79–97)
MONOCYTES # BLD AUTO: 0.54 10*3/MM3 (ref 0.1–0.9)
MONOCYTES NFR BLD AUTO: 8.9 % (ref 5–12)
NEUTROPHILS NFR BLD AUTO: 2.89 10*3/MM3 (ref 1.7–7)
NEUTROPHILS NFR BLD AUTO: 47.8 % (ref 42.7–76)
NRBC BLD AUTO-RTO: 0 /100 WBC (ref 0–0.2)
PLATELET # BLD AUTO: 222 10*3/MM3 (ref 140–450)
PMV BLD AUTO: 11.5 FL (ref 6–12)
POTASSIUM SERPL-SCNC: 4.2 MMOL/L (ref 3.5–5.2)
PROT SERPL-MCNC: 6.8 G/DL (ref 6–8.5)
RBC # BLD AUTO: 4.34 10*6/MM3 (ref 3.77–5.28)
SODIUM SERPL-SCNC: 141 MMOL/L (ref 136–145)
WBC # BLD AUTO: 6.05 10*3/MM3 (ref 3.4–10.8)

## 2020-08-08 PROCEDURE — 93005 ELECTROCARDIOGRAM TRACING: CPT | Performed by: NEUROLOGICAL SURGERY

## 2020-08-08 PROCEDURE — C9803 HOPD COVID-19 SPEC COLLECT: HCPCS

## 2020-08-08 PROCEDURE — 85025 COMPLETE CBC W/AUTO DIFF WBC: CPT

## 2020-08-08 PROCEDURE — 36415 COLL VENOUS BLD VENIPUNCTURE: CPT

## 2020-08-08 PROCEDURE — 80053 COMPREHEN METABOLIC PANEL: CPT

## 2020-08-08 PROCEDURE — U0004 COV-19 TEST NON-CDC HGH THRU: HCPCS

## 2020-08-08 PROCEDURE — U0002 COVID-19 LAB TEST NON-CDC: HCPCS

## 2020-08-09 PROCEDURE — 93010 ELECTROCARDIOGRAM REPORT: CPT | Performed by: INTERNAL MEDICINE

## 2020-08-10 ENCOUNTER — ANESTHESIA EVENT (OUTPATIENT)
Dept: PERIOP | Facility: HOSPITAL | Age: 65
End: 2020-08-10

## 2020-08-10 LAB
REF LAB TEST METHOD: NORMAL
SARS-COV-2 RNA RESP QL NAA+PROBE: NOT DETECTED

## 2020-08-11 ENCOUNTER — APPOINTMENT (OUTPATIENT)
Dept: OTHER | Facility: HOSPITAL | Age: 65
End: 2020-08-11

## 2020-08-11 ENCOUNTER — ANESTHESIA (OUTPATIENT)
Dept: PERIOP | Facility: HOSPITAL | Age: 65
End: 2020-08-11

## 2020-08-11 ENCOUNTER — HOSPITAL ENCOUNTER (OUTPATIENT)
Facility: HOSPITAL | Age: 65
Setting detail: HOSPITAL OUTPATIENT SURGERY
Discharge: HOME | End: 2020-08-11
Attending: NEUROLOGICAL SURGERY | Admitting: NEUROLOGICAL SURGERY

## 2020-08-11 ENCOUNTER — APPOINTMENT (OUTPATIENT)
Dept: GENERAL RADIOLOGY | Facility: HOSPITAL | Age: 65
End: 2020-08-11

## 2020-08-11 VITALS
SYSTOLIC BLOOD PRESSURE: 106 MMHG | HEART RATE: 78 BPM | OXYGEN SATURATION: 97 % | BODY MASS INDEX: 34 KG/M2 | WEIGHT: 168.65 LBS | RESPIRATION RATE: 12 BRPM | HEIGHT: 59 IN | DIASTOLIC BLOOD PRESSURE: 68 MMHG | TEMPERATURE: 97.8 F

## 2020-08-11 DIAGNOSIS — S32.010A COMPRESSION FRACTURE OF L1 VERTEBRA, INITIAL ENCOUNTER (HCC): ICD-10-CM

## 2020-08-11 DIAGNOSIS — Z00.6 EXAMINATION FOR NORMAL COMPARISON OR CONTROL IN CLINICAL RESEARCH: ICD-10-CM

## 2020-08-11 LAB — MRSA DNA SPEC QL NAA+PROBE: NORMAL

## 2020-08-11 PROCEDURE — 25010000002 MIDAZOLAM PER 1 MG: Performed by: ANESTHESIOLOGIST ASSISTANT

## 2020-08-11 PROCEDURE — 22511 PERQ LUMBOSACRAL INJECTION: CPT | Performed by: NEUROLOGICAL SURGERY

## 2020-08-11 PROCEDURE — 25010000002 HYDROMORPHONE PER 4 MG: Performed by: ANESTHESIOLOGIST ASSISTANT

## 2020-08-11 PROCEDURE — 87641 MR-STAPH DNA AMP PROBE: CPT | Performed by: NEUROLOGICAL SURGERY

## 2020-08-11 PROCEDURE — 25010000002 ONDANSETRON PER 1 MG: Performed by: ANESTHESIOLOGIST ASSISTANT

## 2020-08-11 PROCEDURE — 25010000002 DEXAMETHASONE PER 1 MG: Performed by: ANESTHESIOLOGIST ASSISTANT

## 2020-08-11 PROCEDURE — 76000 FLUOROSCOPY <1 HR PHYS/QHP: CPT

## 2020-08-11 PROCEDURE — C1713 ANCHOR/SCREW BN/BN,TIS/BN: HCPCS | Performed by: NEUROLOGICAL SURGERY

## 2020-08-11 PROCEDURE — 72100 X-RAY EXAM L-S SPINE 2/3 VWS: CPT

## 2020-08-11 PROCEDURE — 25010000002 ONDANSETRON PER 1 MG: Performed by: ANESTHESIOLOGY

## 2020-08-11 PROCEDURE — 25010000002 PROPOFOL 10 MG/ML EMULSION: Performed by: ANESTHESIOLOGIST ASSISTANT

## 2020-08-11 PROCEDURE — 88305 TISSUE EXAM BY PATHOLOGIST: CPT | Performed by: NEUROLOGICAL SURGERY

## 2020-08-11 PROCEDURE — 25010000002 FENTANYL CITRATE (PF) 100 MCG/2ML SOLUTION: Performed by: ANESTHESIOLOGIST ASSISTANT

## 2020-08-11 DEVICE — SYSTEM WITHOUT NEEDLES WITH HV BONE CEMENT
Type: IMPLANTABLE DEVICE | Site: BACK | Status: FUNCTIONAL
Brand: AUTOPLEX, VERTAPLEX

## 2020-08-11 RX ORDER — LIDOCAINE HYDROCHLORIDE 10 MG/ML
0.5 INJECTION, SOLUTION EPIDURAL; INFILTRATION; INTRACAUDAL; PERINEURAL ONCE AS NEEDED
Status: DISCONTINUED | OUTPATIENT
Start: 2020-08-11 | End: 2020-08-11 | Stop reason: HOSPADM

## 2020-08-11 RX ORDER — GLYCOPYRROLATE 1 MG/5 ML
SYRINGE (ML) INTRAVENOUS AS NEEDED
Status: DISCONTINUED | OUTPATIENT
Start: 2020-08-11 | End: 2020-08-11 | Stop reason: SURG

## 2020-08-11 RX ORDER — HYDROMORPHONE HCL 110MG/55ML
0.5 PATIENT CONTROLLED ANALGESIA SYRINGE INTRAVENOUS
Status: DISCONTINUED | OUTPATIENT
Start: 2020-08-11 | End: 2020-08-11 | Stop reason: HOSPADM

## 2020-08-11 RX ORDER — SODIUM CHLORIDE 0.9 % (FLUSH) 0.9 %
3-10 SYRINGE (ML) INJECTION AS NEEDED
Status: DISCONTINUED | OUTPATIENT
Start: 2020-08-11 | End: 2020-08-11 | Stop reason: HOSPADM

## 2020-08-11 RX ORDER — SODIUM CHLORIDE 0.9 % (FLUSH) 0.9 %
3 SYRINGE (ML) INJECTION EVERY 12 HOURS SCHEDULED
Status: DISCONTINUED | OUTPATIENT
Start: 2020-08-11 | End: 2020-08-11 | Stop reason: HOSPADM

## 2020-08-11 RX ORDER — FENTANYL CITRATE 50 UG/ML
INJECTION, SOLUTION INTRAMUSCULAR; INTRAVENOUS AS NEEDED
Status: DISCONTINUED | OUTPATIENT
Start: 2020-08-11 | End: 2020-08-11 | Stop reason: SURG

## 2020-08-11 RX ORDER — ONDANSETRON 2 MG/ML
4 INJECTION INTRAMUSCULAR; INTRAVENOUS ONCE
Status: COMPLETED | OUTPATIENT
Start: 2020-08-11 | End: 2020-08-11

## 2020-08-11 RX ORDER — ONDANSETRON 2 MG/ML
INJECTION INTRAMUSCULAR; INTRAVENOUS AS NEEDED
Status: DISCONTINUED | OUTPATIENT
Start: 2020-08-11 | End: 2020-08-11 | Stop reason: SURG

## 2020-08-11 RX ORDER — HYDROMORPHONE HCL 110MG/55ML
0.25 PATIENT CONTROLLED ANALGESIA SYRINGE INTRAVENOUS
Status: DISCONTINUED | OUTPATIENT
Start: 2020-08-11 | End: 2020-08-11 | Stop reason: HOSPADM

## 2020-08-11 RX ORDER — PHENYLEPHRINE HCL IN 0.9% NACL 0.5 MG/5ML
SYRINGE (ML) INTRAVENOUS AS NEEDED
Status: DISCONTINUED | OUTPATIENT
Start: 2020-08-11 | End: 2020-08-11 | Stop reason: SURG

## 2020-08-11 RX ORDER — MIDAZOLAM HYDROCHLORIDE 1 MG/ML
INJECTION INTRAMUSCULAR; INTRAVENOUS AS NEEDED
Status: DISCONTINUED | OUTPATIENT
Start: 2020-08-11 | End: 2020-08-11 | Stop reason: SURG

## 2020-08-11 RX ORDER — DEXAMETHASONE SODIUM PHOSPHATE 4 MG/ML
INJECTION, SOLUTION INTRA-ARTICULAR; INTRALESIONAL; INTRAMUSCULAR; INTRAVENOUS; SOFT TISSUE AS NEEDED
Status: DISCONTINUED | OUTPATIENT
Start: 2020-08-11 | End: 2020-08-11 | Stop reason: SURG

## 2020-08-11 RX ORDER — SCOLOPAMINE TRANSDERMAL SYSTEM 1 MG/1
PATCH, EXTENDED RELEASE TRANSDERMAL AS NEEDED
Status: DISCONTINUED | OUTPATIENT
Start: 2020-08-11 | End: 2020-08-11 | Stop reason: SURG

## 2020-08-11 RX ORDER — LIDOCAINE HYDROCHLORIDE 20 MG/ML
INJECTION, SOLUTION EPIDURAL; INFILTRATION; INTRACAUDAL; PERINEURAL AS NEEDED
Status: DISCONTINUED | OUTPATIENT
Start: 2020-08-11 | End: 2020-08-11 | Stop reason: SURG

## 2020-08-11 RX ORDER — ROCURONIUM BROMIDE 10 MG/ML
INJECTION, SOLUTION INTRAVENOUS AS NEEDED
Status: DISCONTINUED | OUTPATIENT
Start: 2020-08-11 | End: 2020-08-11 | Stop reason: SURG

## 2020-08-11 RX ORDER — FENTANYL CITRATE 50 UG/ML
50 INJECTION, SOLUTION INTRAMUSCULAR; INTRAVENOUS
Status: DISCONTINUED | OUTPATIENT
Start: 2020-08-11 | End: 2020-08-11 | Stop reason: HOSPADM

## 2020-08-11 RX ORDER — NEOSTIGMINE METHYLSULFATE 5 MG/5 ML
SYRINGE (ML) INTRAVENOUS AS NEEDED
Status: DISCONTINUED | OUTPATIENT
Start: 2020-08-11 | End: 2020-08-11 | Stop reason: SURG

## 2020-08-11 RX ORDER — LIDOCAINE HYDROCHLORIDE AND EPINEPHRINE 10; 10 MG/ML; UG/ML
INJECTION, SOLUTION INFILTRATION; PERINEURAL AS NEEDED
Status: DISCONTINUED | OUTPATIENT
Start: 2020-08-11 | End: 2020-08-11 | Stop reason: HOSPADM

## 2020-08-11 RX ORDER — MELOXICAM 7.5 MG/1
7.5 TABLET ORAL DAILY
Qty: 60 TABLET | Refills: 0 | Status: SHIPPED | OUTPATIENT
Start: 2020-08-11

## 2020-08-11 RX ORDER — CLINDAMYCIN PHOSPHATE 900 MG/50ML
900 INJECTION INTRAVENOUS ONCE
Status: COMPLETED | OUTPATIENT
Start: 2020-08-11 | End: 2020-08-11

## 2020-08-11 RX ORDER — SODIUM CHLORIDE, SODIUM LACTATE, POTASSIUM CHLORIDE, CALCIUM CHLORIDE 600; 310; 30; 20 MG/100ML; MG/100ML; MG/100ML; MG/100ML
9 INJECTION, SOLUTION INTRAVENOUS CONTINUOUS PRN
Status: DISCONTINUED | OUTPATIENT
Start: 2020-08-11 | End: 2020-08-11 | Stop reason: HOSPADM

## 2020-08-11 RX ORDER — OXYCODONE HYDROCHLORIDE 5 MG/1
10 TABLET ORAL EVERY 4 HOURS PRN
Status: COMPLETED | OUTPATIENT
Start: 2020-08-11 | End: 2020-08-11

## 2020-08-11 RX ORDER — FENTANYL CITRATE 50 UG/ML
25 INJECTION, SOLUTION INTRAMUSCULAR; INTRAVENOUS
Status: DISCONTINUED | OUTPATIENT
Start: 2020-08-11 | End: 2020-08-11 | Stop reason: HOSPADM

## 2020-08-11 RX ORDER — PROPOFOL 10 MG/ML
VIAL (ML) INTRAVENOUS AS NEEDED
Status: DISCONTINUED | OUTPATIENT
Start: 2020-08-11 | End: 2020-08-11 | Stop reason: SURG

## 2020-08-11 RX ORDER — HYDROMORPHONE HCL 110MG/55ML
1 PATIENT CONTROLLED ANALGESIA SYRINGE INTRAVENOUS
Status: DISCONTINUED | OUTPATIENT
Start: 2020-08-11 | End: 2020-08-11 | Stop reason: HOSPADM

## 2020-08-11 RX ADMIN — DEXAMETHASONE SODIUM PHOSPHATE 4 MG: 4 INJECTION, SOLUTION INTRAMUSCULAR; INTRAVENOUS at 14:50

## 2020-08-11 RX ADMIN — SODIUM CHLORIDE, SODIUM LACTATE, POTASSIUM CHLORIDE, AND CALCIUM CHLORIDE 9 ML/HR: 600; 310; 30; 20 INJECTION, SOLUTION INTRAVENOUS at 11:32

## 2020-08-11 RX ADMIN — FENTANYL CITRATE 50 MCG: 50 INJECTION, SOLUTION INTRAMUSCULAR; INTRAVENOUS at 15:19

## 2020-08-11 RX ADMIN — HYDROMORPHONE HYDROCHLORIDE 0.5 MG: 2 INJECTION, SOLUTION INTRAMUSCULAR; INTRAVENOUS; SUBCUTANEOUS at 16:10

## 2020-08-11 RX ADMIN — ONDANSETRON 4 MG: 2 INJECTION INTRAMUSCULAR; INTRAVENOUS at 14:07

## 2020-08-11 RX ADMIN — MIDAZOLAM 2 MG: 1 INJECTION INTRAMUSCULAR; INTRAVENOUS at 14:33

## 2020-08-11 RX ADMIN — FENTANYL CITRATE 50 MCG: 50 INJECTION, SOLUTION INTRAMUSCULAR; INTRAVENOUS at 14:33

## 2020-08-11 RX ADMIN — HYDROMORPHONE HYDROCHLORIDE 0.5 MG: 2 INJECTION, SOLUTION INTRAMUSCULAR; INTRAVENOUS; SUBCUTANEOUS at 15:55

## 2020-08-11 RX ADMIN — ONDANSETRON 4 MG: 2 INJECTION INTRAMUSCULAR; INTRAVENOUS at 15:16

## 2020-08-11 RX ADMIN — Medication 0.4 MG: at 15:17

## 2020-08-11 RX ADMIN — LIDOCAINE HYDROCHLORIDE 80 MG: 20 INJECTION, SOLUTION EPIDURAL; INFILTRATION; INTRACAUDAL; PERINEURAL at 14:37

## 2020-08-11 RX ADMIN — PROPOFOL 150 MG: 10 INJECTION, EMULSION INTRAVENOUS at 14:37

## 2020-08-11 RX ADMIN — HYDROMORPHONE HYDROCHLORIDE 0.5 MG: 2 INJECTION, SOLUTION INTRAMUSCULAR; INTRAVENOUS; SUBCUTANEOUS at 15:38

## 2020-08-11 RX ADMIN — OXYCODONE HYDROCHLORIDE 10 MG: 5 TABLET ORAL at 14:07

## 2020-08-11 RX ADMIN — Medication 2 MG: at 15:17

## 2020-08-11 RX ADMIN — CLINDAMYCIN IN 5 PERCENT DEXTROSE 900 MG: 18 INJECTION, SOLUTION INTRAVENOUS at 14:41

## 2020-08-11 RX ADMIN — SCOPALAMINE 1 PATCH: 1 PATCH, EXTENDED RELEASE TRANSDERMAL at 14:59

## 2020-08-11 RX ADMIN — PHENYLEPHRINE HYDROCHLORIDE 100 MCG: 10 INJECTION INTRAVENOUS at 15:11

## 2020-08-11 RX ADMIN — ROCURONIUM BROMIDE 40 MG: 10 INJECTION, SOLUTION INTRAVENOUS at 14:37

## 2020-08-11 NOTE — ANESTHESIA PREPROCEDURE EVALUATION
Anesthesia Evaluation     Patient summary reviewed and Nursing notes reviewed   NPO Solid Status: > 8 hours  NPO Liquid Status: > 8 hours           Airway   Mallampati: II  TM distance: >3 FB  Neck ROM: full  No difficulty expected  Dental - normal exam     Pulmonary - negative pulmonary ROS and normal exam   Cardiovascular - normal exam    (+) hyperlipidemia,       Neuro/Psych  (+) psychiatric history Anxiety and Depression,     GI/Hepatic/Renal/Endo - negative ROS     Musculoskeletal (-) negative ROS    Abdominal  - normal exam    Bowel sounds: normal.   Substance History - negative use     OB/GYN negative ob/gyn ROS         Other      history of cancer                    Anesthesia Plan    ASA 3     general     intravenous induction     Anesthetic plan, all risks, benefits, and alternatives have been provided, discussed and informed consent has been obtained with: patient.    Plan discussed with CRNA and CAA.

## 2020-08-11 NOTE — ANESTHESIA PROCEDURE NOTES
Airway  Urgency: elective    Date/Time: 8/11/2020 2:37 PM  Airway not difficult    General Information and Staff    Patient location during procedure: OR    Indications and Patient Condition  Indications for airway management: airway protection    Preoxygenated: yes  MILS maintained throughout  Mask difficulty assessment: 2 - vent by mask + OA or adjuvant +/- NMBA    Final Airway Details  Final airway type: endotracheal airway      Successful airway: ETT  Cuffed: yes   Successful intubation technique: direct laryngoscopy  Facilitating devices/methods: intubating stylet  Endotracheal tube insertion site: oral  Blade: Vinh  Blade size: 3  ETT size (mm): 7.0  Cormack-Lehane Classification: grade I - full view of glottis  Placement verified by: capnometry   Cuff volume (mL): 7  Measured from: teeth  ETT/EBT  to teeth (cm): 21  Number of attempts at approach: 1  Assessment: lips, teeth, and gum same as pre-op and atraumatic intubation

## 2020-08-11 NOTE — ANESTHESIA POSTPROCEDURE EVALUATION
Patient: Marilou Toro    Procedure Summary     Date:  08/11/20 Room / Location:  [facility] OR 12 / [facility] MAIN OR    Anesthesia Start:  1431 Anesthesia Stop:  1539    Procedure:  VERTEBROPLASTY L1 with bone biopsy (N/A ) Diagnosis:       Compression fracture of L1 vertebra, initial encounter (CMS/[facility])      (Compression fracture of L1 vertebra, initial encounter (CMS/[facility]) [S32.010A])    Surgeon:  Andrew Neal MD Provider:  Heidy Xie MD    Anesthesia Type:  general ASA Status:  3          Anesthesia Type: general    Vitals  Vitals Value Taken Time   /48 8/11/2020  3:52 PM   Temp 97.5 °F (36.4 °C) 8/11/2020  3:30 PM   Pulse 87 8/11/2020  3:55 PM   Resp 15 8/11/2020  3:45 PM   SpO2 88 % 8/11/2020  3:55 PM   Vitals shown include unvalidated device data.        Post Anesthesia Care and Evaluation    Patient location during evaluation: PACU  Patient participation: complete - patient participated  Level of consciousness: awake  Pain score: 0  Pain management: adequate  Airway patency: patent  Anesthetic complications: No anesthetic complications  PONV Status: none  Cardiovascular status: acceptable  Respiratory status: acceptable  Hydration status: acceptable    Comments: Patient seen and examined postoperatively; vital signs stable; SpO2 greater than or equal to 90%; cardiopulmonary status stable; nausea/vomiting adequately controlled; pain adequately controlled; no apparent anesthesia complications; patient discharged from anesthesia care when discharge criteria were met

## 2020-08-13 LAB
LAB AP CASE REPORT: NORMAL
PATH REPORT.FINAL DX SPEC: NORMAL
PATH REPORT.GROSS SPEC: NORMAL

## 2020-08-21 ENCOUNTER — TELEPHONE (OUTPATIENT)
Dept: NEUROSURGERY | Facility: CLINIC | Age: 65
End: 2020-08-21

## 2020-08-21 DIAGNOSIS — S32.010A COMPRESSION FRACTURE OF L1 VERTEBRA, INITIAL ENCOUNTER (HCC): Primary | ICD-10-CM

## 2020-08-21 RX ORDER — HYDROCODONE BITARTRATE AND ACETAMINOPHEN 7.5; 325 MG/1; MG/1
1 TABLET ORAL EVERY 6 HOURS PRN
Qty: 60 TABLET | Refills: 0 | Status: SHIPPED | OUTPATIENT
Start: 2020-08-21 | End: 2020-09-02

## 2020-08-21 NOTE — TELEPHONE ENCOUNTER
Patient called stating that she is still experiencing pain after her vertebroplasty on 8/11.  She said the pain has never stopped and has popping on the right side and the pain shoots down her leg.  She is requesting more pain medication.  She tried taking the mobic that was sent in after surgery but she it made her sick to her stomach.  She has been taking the hydrocodone 5-325 that was given from her physician for her wrist fracture.  That was filled on the 8/17.  She said it isnt touching the pain.  She only has six pills left.  She is asking if she can have hydrocodone 7.5.  They have worked for her in the past after her other back surgeries and they have last longer and controlled the pain klaudia.

## 2020-09-02 ENCOUNTER — OFFICE VISIT (OUTPATIENT)
Dept: NEUROSURGERY | Facility: CLINIC | Age: 65
End: 2020-09-02

## 2020-09-02 DIAGNOSIS — S32.010A COMPRESSION FRACTURE OF L1 VERTEBRA, INITIAL ENCOUNTER (HCC): ICD-10-CM

## 2020-09-02 DIAGNOSIS — Z98.890 S/P VERTEBROPLASTY: Primary | ICD-10-CM

## 2020-09-02 PROCEDURE — 99442 PR PHYS/QHP TELEPHONE EVALUATION 11-20 MIN: CPT | Performed by: NURSE PRACTITIONER

## 2020-09-02 RX ORDER — HYDROCODONE BITARTRATE AND ACETAMINOPHEN 7.5; 325 MG/1; MG/1
1 TABLET ORAL EVERY 6 HOURS PRN
Qty: 60 TABLET | Refills: 0 | Status: SHIPPED | OUTPATIENT
Start: 2020-09-02

## 2020-09-02 RX ORDER — TIZANIDINE HYDROCHLORIDE 4 MG/1
4 CAPSULE, GELATIN COATED ORAL 3 TIMES DAILY
Qty: 90 CAPSULE | Refills: 1 | Status: SHIPPED | OUTPATIENT
Start: 2020-09-02 | End: 2020-11-02

## 2020-09-02 NOTE — PROGRESS NOTES
"You have chosen to receive care through a telephone visit. Do you consent to use a telephone visit for your medical care today? Yes      Subjective   History of Present Illness: Marilou Toro is a 65 y.o. female is being seen via telehealth for her f/u post op appointment. She under went a L1 vertebroplasty on 8/11/2020 with Dr. Neal.  Pt states she has been doing ok but is experiencing some back pain that she states \"catches\" her at intermittent moments taking her breath away. She feels like he back muscles are tensing up at the same time. She states that she will wear her back brace when it flairs and that it does help somewhat. Pt denies any falls or leg pain, bowel or bladder dysfunction, gait dysfunction, soa, c/p or leg swelling. She also denies any swelling, drainage or redness at her incisional area on her back.       History of Present Illness    The following portions of the patient's history were reviewed and updated as appropriate: allergies, current medications, past family history, past medical history, past social history, past surgical history and problem list.    Review of Systems    Objective     .There were no vitals taken for this visit.   There is no height or weight on file to calculate BMI.    No physical assessment performed due to telehealth visit.   Pt is alert and oriented X 3  Recent and remote memory intact  Speech is articulate and coherent    Assessment/Plan   Medical Decision Making:    I feel ms. Mendoza back pain is mostly muscular in nature. She has no radiculopathy and pain is intermittent and does resolve. I will try her on a course of muscle relaxers to see if that helps. She is to call office if she is experiencing any increase of pain or radicular symptoms.     Marilou was seen today for post-op.    Diagnoses and all orders for this visit:    S/P vertebroplasty  -     XR Spine Lumbar Complete With Flex & Ext; Future  -     HYDROcodone-acetaminophen (NORCO) 7.5-325 MG per " tablet; Take 1 tablet by mouth Every 6 (Six) Hours As Needed for Moderate Pain .    Compression fracture of L1 vertebra, initial encounter (CMS/[facility])    Other orders  -     TiZANidine (Zanaflex) 4 MG capsule; Take 1 capsule by mouth 3 (Three) Times a Day.      No follow-ups on file.      I have spent 15 minutes in direct communication with patient discussing post operative expectations and activity recommendations/restrictions including walking, lifting restrictions and limiting bending and twisting movements. Patient agrees to follow with plan.       Valerie Ellis, APRN  09/02/20  15:19

## 2020-09-03 ENCOUNTER — TELEPHONE (OUTPATIENT)
Dept: NEUROSURGERY | Facility: CLINIC | Age: 65
End: 2020-09-03

## 2020-09-03 NOTE — TELEPHONE ENCOUNTER
I called and s/w patient letting her know that I got her Tizanidine taken care of and also cancelled her refill of hydrocodone because she just got some on 9/1/20. Patient asked spouse if she picked some up and he said that he did pick that rx up.  Patient stated that she didn't know.

## 2020-09-03 NOTE — TELEPHONE ENCOUNTER
PATIENT CALLED BECAUSE THE PHARMACY NEEDS RORY BAJWA TO CALL TO GIVE AUTH. FOR THE PHARMACY TO FILL HYDROCODONE.  HE NEEDS A PRE APPROVAL TO FILL IT EARLIER OTHERWISE IT WILL NOT BE FILLED UNTIL NEXT WEEK.  THE XANAFLEX CAN BE FILLED IN TABLETS BUT RORY CALLED IN CAPSULES.  IT WILL TAKE 10 DAYS FOR HER INSURANCE WILL AUTH THE CAPSULES.  IS IT OKAY FOR HER TO TAKE THE TABLETS?  THE PHARMACIST TOLD HER THAT SHE WILL NOT GET THE SAME RESULT IN A TABLET.       [phone]

## 2020-09-29 ENCOUNTER — TELEPHONE (OUTPATIENT)
Dept: NEUROSURGERY | Facility: CLINIC | Age: 65
End: 2020-09-29

## 2020-09-29 NOTE — TELEPHONE ENCOUNTER
"PATIENT CALLED REGARDING THE FOLLOWING APPOINTMENT.  Type: POST OP FOLLOW UP  Date: 11/4/20  Provider: DR. BOOKER  Caller: PATIENT  Phone Number: [phone]  Reason: PATIENT ALREADY SCHEDULED PRIOR TO CALL BUT CALLED REQUESTING ADDITIONAL CLARIFICATION REGARDING TIMEFRAME & WHAT TO DO TO MANAGE POST OP CARE IN THE MEANTIME. PATIENT STATES THEY HAVE BEEN EXPERIENCING SOME DISCOMFORT & PAIN THAT HAS NOT RESOLVED WITH CURRENT MUSCLE RELAXES. PATIENT CONCERNED ABOUT A \"POPPING\" SENSATION THAT IS OCCURRING. PATIENT PREFERS ADDITIONAL INSTRUCTIONS ON WHAT SHE CAN AND CANNOT DO TO AGGREVIAYTE CURRENT SYMPTOMS, WHAT TYPE OF SYMPTOMS WOULD BE DEEM ALARMING, WHAT SHE CAN DO OR TAKE FOR PAIN, & MORE POST-OP CARE WHILE WAITING ON UPCOMING APPT.  Availability for callback: ANYTIME  Preferred dates for scheduling:    PATIENT CAN BE CONTACTED WITH FURTHER INSTRUCTION  "

## 2020-09-29 NOTE — TELEPHONE ENCOUNTER
Previous pain medicine was discontinued due to patient receiving other pain medication from another provider during the same time she was receiving from this office. This was verified through inspect and eagle report. She can be set up for follow up visit with me for any other concerns.

## 2020-10-09 NOTE — TELEPHONE ENCOUNTER
"PATIENT CALLED TO CHECK THE STATUS OF MEDICAL ADVISE. PATIENT STATES THEY HAVE NOT RECEIVED A CALLBACK PRIOR TO CALL. PATIENT STATES THEY WERE UNAWARE OF APPT & VERY CONCERNED ABOUT INCREASED MUSCLE SPASMS THAT SEEM TO BECOME SO INTENSE & RANDOM THAT SHE ACCIDENTALLY TENDS TO BUMP ANYONE IN CLOSE PROXIMITY & \"JUST WANTS TO TALK TO THE DOCTOR ABOUT IF THIS IS NORMAL & IF IT IS NORMAL TO NOT SEE THE PROVIDER AFTER SURGERY\". PATIENT STATES SHE IS \"BENT UP & WALKING WITH A HUTCH\" & \"FEELS DISCARDED\" PATIENT VERY FRUSTRATED WITH TREATMENT & EMPHASIZES THAT SHE IS NOT INTERESTED IN MEDICATION BUT JUST WANTS TO SPEAK TO SOMEONE WHO CAN GIVE HER SOME SENSE OF GUIDANCE. INFORMED PT THAT PROVIDER WILL GO OVER CONCERNS & INSTRUCTIONS AT APPT. HOW WOULD PROVIDER LIKE TO PROCEED?    PATIENT CAN BE CONTACTED [phone] WITH FURTHER INSTRUCTIONS  "

## 2020-10-14 ENCOUNTER — HOSPITAL ENCOUNTER (OUTPATIENT)
Dept: GENERAL RADIOLOGY | Facility: HOSPITAL | Age: 65
Discharge: HOME | End: 2020-10-14
Admitting: NURSE PRACTITIONER

## 2020-10-14 DIAGNOSIS — Z98.890 S/P VERTEBROPLASTY: ICD-10-CM

## 2020-10-14 PROCEDURE — 72114 X-RAY EXAM L-S SPINE BENDING: CPT

## 2020-10-15 ENCOUNTER — OFFICE VISIT (OUTPATIENT)
Dept: NEUROSURGERY | Facility: CLINIC | Age: 65
End: 2020-10-15

## 2020-10-15 DIAGNOSIS — M54.16 LUMBAR RADICULOPATHY: ICD-10-CM

## 2020-10-15 DIAGNOSIS — Z98.890 S/P VERTEBROPLASTY: ICD-10-CM

## 2020-10-15 DIAGNOSIS — M47.816 FACET ARTHROPATHY, LUMBAR: Primary | ICD-10-CM

## 2020-10-15 PROCEDURE — 99443 PR PHYS/QHP TELEPHONE EVALUATION 21-30 MIN: CPT | Performed by: NURSE PRACTITIONER

## 2020-10-15 NOTE — PROGRESS NOTES
"You have chosen to receive care through a telephone visit. Do you consent to use a telephone visit for your medical care today? Yes      Subjective     History of Present Illness: Marilou Toro is a 65 y.o. female being seen via [facility] for f/u of her low back pain and back spasms. Pt was last seen by myself on 9/2/2020 for her f/u post op appointment. She underwent an L1 vertebroplasty with Dr. Neal on 8/11/2020.    At that visit patient says she at her back pain was better but that she was having some back spasms. It was felt that patient was suffering from some muscle spasms related to her facet arthritis was placed on a course of antispasmodics. She denied amanda signs/symptoms of infection and stated that she states she wearing her brace when it her back was \"flairing\".  Pt was sent for lumbar flex and ext films for post op review.   Patient also had requested a refill of her pain medication. The pharmacy did call the office the next day to advise us that her narcotic pain medication would not be filled due to her filling pain medication with another provider the week prior. The narcotic pain medication prescribed by myself was then subsequently discontinued and patient was notified from office personnel that we had received call from pharmacy advising us that she had just received a prescription from another provider the week prior and that a subsequent INSPECT report had been run and demonstrated several pain medication prescriptions from differing providers had been prescribed recently. It was also explained to patient that she did not share this information with our office and would not be prescribed anymore narcotic pain medication at this time from our office.    Patient states that she is in a consider amount of back pain. She states that the anti-spasmodic that she was prescribed has not helped with any of her back spasms or back “catches”. She describes the pain as tightness along her lower back " with radiation into buttocks, and down her legs bilaterally along the lateral sides down into her calves. She states that this is a daily occurrence for her and seems to be worsening. At times she feels that her legs will suddenly just jerk. she states that she has been taking her anti spasmodic as well as Ibuprofen 800 milligrams every three hours to help with the pain. She denies any radiculopathy symptoms in her feet or toes. Patient denies any numbness or tingling and or leg weakness.   Patient denies any bowel/bladder dysfunction, neck pain, gait imbalance, or recent fall.         Back Pain  This is a chronic problem. The current episode started more than 1 month ago. The problem occurs daily. The problem has been gradually worsening since onset. The pain is present in the gluteal and lumbar spine. The quality of the pain is described as aching and cramping. The pain radiates to the left thigh, left knee and right thigh. The pain is moderate. The pain is the same all the time. The symptoms are aggravated by position. Associated symptoms include leg pain. Pertinent negatives include no bladder incontinence, bowel incontinence, numbness, tingling or weakness. Risk factors: osteopenia. She has tried analgesics, NSAIDs, walking and heat for the symptoms.       The following portions of the patient's history were reviewed and updated as appropriate: allergies, current medications, past family history, past medical history, past social history, past surgical history and problem list.    Review of Systems   Cardiovascular: Negative for leg swelling.   Gastrointestinal: Negative for bowel incontinence.   Genitourinary: Negative for bladder incontinence, decreased urine volume, difficulty urinating and flank pain.   Musculoskeletal: Positive for back pain. Negative for gait problem, neck pain and neck stiffness.   Neurological: Negative for tingling, weakness and numbness.   All other systems reviewed and are  negative.        Objective     .There were no vitals taken for this visit.   There is no height or weight on file to calculate BMI.        Assessment/Plan   Independent Review of Radiographic Studies:      I personally reviewed the images from the following studies.    Lumbar x rays 10/14/2020    1. Stable L1 augmentation for compression fracture. No significant change in loss of height from previous imaging.   2. Remote L5 vertebral body augmentation with disc material seen in the L4-L5 level. No significant loss in height from previous imaging.   3. Anterolisthesis L5 on S1 - 13 mm which has not changed since previous imaging and most likely related to secondary facet arthritis.  4. Severe multilevel facet changes throughout lower lumbar spine.     Medical Decision Making:      I spent approximately 25 minutes on the phone in direct communication with the patient reviewing prior symptoms, current symptoms, and any new symptoms patient is having. We did review her medications and new imaging and possible interventions going forward.   Patient is describing worsening pain in her back with more spasms. I feel that due to her severe facet arthropathy that this is most likely the reason for her back pains at this time. Her pain down her legs does correlate at the L4-L5 level and there is concerned that the disc material is now causing some radicular symptoms there. She has tried Various medications including Neurontin, Lyrica, other antispasmodics with no avail and does not wish to try them again. I did tell her that I would prescribe no narcotic pain medicine at this time and she understood. I explained that I would like to send her to pain management for dedicated facet blocks in her lower lumbar spine as well as start a course of physical therapy for core strengthening. I asked her to call me in approximately four weeks to let me know how she's doing and if no better we will refer her over to [place] to be seen  by one of the neurosurgeons there due to doctor Neal leaving the practice. She has agreed to plan and wishes to proceed.     Diagnoses and all orders for this visit:    1. Facet arthropathy, lumbar (Primary)    2. Lumbar radiculopathy  -     Ambulatory Referral to Pain Management  -     Ambulatory Referral to Physical Therapy Evaluate and treat; ROM, Strengthening    3. S/P vertebroplasty      Return in about 2 months (around 12/15/2020).           Valerie Ellis, TAVO  10/21/20  09:39 EDT

## 2020-11-02 RX ORDER — TIZANIDINE 4 MG/1
TABLET ORAL
Qty: 90 TABLET | Refills: 3 | Status: SHIPPED | OUTPATIENT
Start: 2020-11-02

## 2021-02-12 ENCOUNTER — TELEPHONE (OUTPATIENT)
Dept: NEUROSURGERY | Facility: CLINIC | Age: 66
End: 2021-02-12

## 2021-02-12 NOTE — TELEPHONE ENCOUNTER
Caller: ROSA SANDOVAL    Relationship: SELF    Best call back number: [phone]    What orders are you requesting (i.e. lab or imaging): REFERRAL TO NEUROSURGERY    In what timeframe would the patient need to come in: ASAP    Where will you receive your lab/imaging services: ANY    Additional notes: THE PATIENT HAD PREVIOUSLY SEEN DR. BOOKER AND SAID THAT OUR OFFICE REFERRED HER TO DR. LOMBARDI, BUT HER REFERRAL WAS DENIED. SHE WANTS TO BE REFERRED TO ANOTHER NEUROSURGEON.

## 2021-02-18 RX ORDER — TIZANIDINE 4 MG/1
4 TABLET ORAL 3 TIMES DAILY
Qty: 270 TABLET | Refills: 0 | OUTPATIENT
Start: 2021-02-18

## (undated) DEVICE — ACCESS CANNULA: Brand: IVAS

## (undated) DEVICE — C-ARM: Brand: UNBRANDED

## (undated) DEVICE — GLV SURG DERMASSURE GRN LF PF 8.5

## (undated) DEVICE — DRAPE SHEET ULTRAGARD: Brand: MEDLINE

## (undated) DEVICE — DRP C/ARMOR

## (undated) DEVICE — PK MINOR LAPAROTOMY 50

## (undated) DEVICE — 3M™ IOBAN™ 2 ANTIMICROBIAL INCISE DRAPE 6650EZ: Brand: IOBAN™ 2

## (undated) DEVICE — 3M™ STERI-STRIP™ REINFORCED ADHESIVE SKIN CLOSURES, R1547, 1/2 IN X 4 IN (12 MM X 100 MM), 6 STRIPS/ENVELOPE: Brand: 3M™ STERI-STRIP™

## (undated) DEVICE — BONE BIOPSY KIT: Brand: IVAS

## (undated) DEVICE — GAUZE,SPONGE,FLUFF,6"X6.75",STRL,5/TRAY: Brand: MEDLINE

## (undated) DEVICE — GLV SURG SENSICARE PI PF LF 7 GRN STRL

## (undated) DEVICE — CEMENT CANNULA FOR KIT: Brand: VERTEPORT

## (undated) DEVICE — ORG INST STRIP/TS ADHS 2X10IN YEL STRL

## (undated) DEVICE — INTENDED FOR TISSUE SEPARATION, AND OTHER PROCEDURES THAT REQUIRE A SHARP SURGICAL BLADE TO PUNCTURE OR CUT.: Brand: BARD-PARKER ® CARBON RIB-BACK BLADES

## (undated) DEVICE — CUFF SCD HEMOFORCE SEQ CALF STD MD

## (undated) DEVICE — GLV SURG SIGNATURE ESSENTIAL PF LTX SZ8

## (undated) DEVICE — CONTAINER,SPECIMEN,OR STERILE,4OZ: Brand: MEDLINE

## (undated) DEVICE — GLV SURG SIGNATURE ESSENTIAL PF LTX SZ6.5

## (undated) DEVICE — DECANTER: Brand: UNBRANDED

## (undated) DEVICE — SOL IRRIG H2O 1000ML STRL

## (undated) DEVICE — PK PROC TURNOVER